# Patient Record
Sex: MALE | Race: WHITE | NOT HISPANIC OR LATINO | Employment: OTHER | URBAN - METROPOLITAN AREA
[De-identification: names, ages, dates, MRNs, and addresses within clinical notes are randomized per-mention and may not be internally consistent; named-entity substitution may affect disease eponyms.]

---

## 2017-07-14 ENCOUNTER — TELEPHONE (OUTPATIENT)
Dept: NEUROSURGERY | Facility: CLINIC | Age: 72
End: 2017-07-14

## 2017-07-14 NOTE — TELEPHONE ENCOUNTER
Called pt regarding sacrum pain. Pt seen previously in our office however did not schedule his f/u appt after MRI. Appt date, time, and location given. Pt verbalized understanding.   ----- Message from Melvina Bender sent at 7/13/2017 11:51 AM CDT -----  Contact: self  Patient called stating he's having sacrum issues. Patient call back is [phone].

## 2017-08-01 ENCOUNTER — OFFICE VISIT (OUTPATIENT)
Dept: NEUROSURGERY | Facility: CLINIC | Age: 72
End: 2017-08-01
Payer: MEDICARE

## 2017-08-01 ENCOUNTER — HOSPITAL ENCOUNTER (OUTPATIENT)
Dept: RADIOLOGY | Facility: HOSPITAL | Age: 72
Discharge: HOME | End: 2017-08-01
Attending: NEUROLOGICAL SURGERY
Payer: MEDICARE

## 2017-08-01 VITALS
HEART RATE: 70 BPM | RESPIRATION RATE: 20 BRPM | HEIGHT: 74 IN | SYSTOLIC BLOOD PRESSURE: 112 MMHG | BODY MASS INDEX: 21.69 KG/M2 | DIASTOLIC BLOOD PRESSURE: 72 MMHG | WEIGHT: 169 LBS

## 2017-08-01 DIAGNOSIS — Z98.890 HISTORY OF LUMBAR SURGERY: Primary | ICD-10-CM

## 2017-08-01 DIAGNOSIS — M47.816 LUMBAR SPONDYLOSIS: ICD-10-CM

## 2017-08-01 DIAGNOSIS — Z98.1 HISTORY OF LUMBAR FUSION: ICD-10-CM

## 2017-08-01 DIAGNOSIS — G89.29 CHRONIC BILATERAL LOW BACK PAIN, WITH SCIATICA PRESENCE UNSPECIFIED: ICD-10-CM

## 2017-08-01 DIAGNOSIS — Z98.890 HISTORY OF LUMBAR SURGERY: ICD-10-CM

## 2017-08-01 DIAGNOSIS — M54.5 CHRONIC BILATERAL LOW BACK PAIN, WITH SCIATICA PRESENCE UNSPECIFIED: ICD-10-CM

## 2017-08-01 PROCEDURE — 1159F MED LIST DOCD IN RCRD: CPT | Mod: S$GLB,,, | Performed by: PHYSICIAN ASSISTANT

## 2017-08-01 PROCEDURE — 99214 OFFICE O/P EST MOD 30 MIN: CPT | Mod: S$GLB,,, | Performed by: PHYSICIAN ASSISTANT

## 2017-08-01 PROCEDURE — 72114 X-RAY EXAM L-S SPINE BENDING: CPT | Mod: TC,PO

## 2017-08-01 PROCEDURE — 1125F AMNT PAIN NOTED PAIN PRSNT: CPT | Mod: S$GLB,,, | Performed by: PHYSICIAN ASSISTANT

## 2017-08-01 PROCEDURE — 72114 X-RAY EXAM L-S SPINE BENDING: CPT | Mod: 26,,, | Performed by: RADIOLOGY

## 2017-08-02 NOTE — PROGRESS NOTES
"Neurosurgery History & Physical    Patient ID: Garcia King is a 72 y.o. male.    Chief Complaint   Patient presents with    Lumbar Spine Pain (L-Spine)     Lumbar pain that radiates down BLE. Prior surgery with Dr. Redmond. Patient reports feeling well after surgery. Was going to PT in [place] and injured "something in his back". Denies any numbness or tingling.        Review of Systems   Constitutional: Negative for activity change, chills, fatigue and unexpected weight change.   HENT: Negative for hearing loss, tinnitus, trouble swallowing and voice change.    Eyes: Negative for visual disturbance.   Respiratory: Negative for apnea, chest tightness and shortness of breath.    Cardiovascular: Negative for chest pain and palpitations.   Gastrointestinal: Negative for abdominal pain, constipation, diarrhea, nausea and vomiting.   Genitourinary: Negative for difficulty urinating, dysuria and frequency.   Musculoskeletal: Positive for back pain and myalgias. Negative for gait problem, neck pain and neck stiffness.   Skin: Negative for wound.   Neurological: Negative for dizziness, tremors, seizures, facial asymmetry, speech difficulty, weakness, light-headedness, numbness and headaches.   Psychiatric/Behavioral: Negative for confusion and decreased concentration.       Past Medical History:   Diagnosis Date    Anticoagulant long-term use     eliquis    Arthritis     COPD (chronic obstructive pulmonary disease)     Coronary artery disease     Diabetes mellitus     Hypertension     Lumbar stenosis without neurogenic claudication     Thyroid disease     hypo     Social History     Social History    Marital status: Unknown     Spouse name: N/A    Number of children: N/A    Years of education: N/A     Occupational History    Not on file.     Social History Main Topics    Smoking status: Former Smoker     Packs/day: 3.00     Years: 30.00     Quit date: 1989    Smokeless tobacco: Never Used    Alcohol use " Yes      Comment: occasional    Drug use: No    Sexual activity: Not on file     Other Topics Concern    Not on file     Social History Narrative    No narrative on file     Family History   Problem Relation Age of Onset    Heart disease Mother     Hypertension Mother     COPD Mother     No Known Problems Father      Review of patient's allergies indicates:   Allergen Reactions    Adhesive Blisters and Hives    Statins-hmg-coa reductase inhibitors Other (See Comments)     Joint/muscle aches       Current Outpatient Prescriptions:     ALBUTEROL SULFATE (PROAIR HFA INHL), Inhale into the lungs., Disp: , Rfl:     alprazolam (XANAX) 0.5 MG tablet, Take 0.5 mg by mouth 3 (three) times daily as needed. , Disp: , Rfl:     apixaban 5 mg Tab, Take 5 mg by mouth 2 (two) times daily., Disp: , Rfl:     aspirin 81 MG Chew, Take 81 mg by mouth once daily., Disp: , Rfl:     ezetimibe (ZETIA) 10 mg tablet, Take 10 mg by mouth every evening. , Disp: , Rfl:     fenofibrate 160 MG Tab, Take 160 mg by mouth once daily., Disp: , Rfl:     fluocinonide (LIDEX) 0.05 % external solution, Apply topically 2 (two) times daily as needed., Disp: , Rfl:     fluticasone (FLONASE) 50 mcg/actuation nasal spray, 1 spray by Each Nare route 2 (two) times daily. , Disp: , Rfl:     FLUZONE HIGH-DOSE 2016-17, PF, 180 mcg/0.5 mL Syrg, TO BE ADMINISTERED BY PHARMACIST FOR IMMUNIZATION, Disp: , Rfl: 0    gabapentin (NEURONTIN) 300 MG capsule, Take 300 mg by mouth 3 (three) times daily., Disp: , Rfl:     ipratropium (ATROVENT) 0.06 % nasal spray, 2 sprays by Nasal route 2 (two) times daily. , Disp: , Rfl:     isosorbide mononitrate (IMDUR) 60 MG 24 hr tablet, Take 60 mg by mouth once daily., Disp: , Rfl:     levothyroxine (SYNTHROID) 50 MCG tablet, Take 50 mcg by mouth once daily., Disp: , Rfl:     lisinopril (PRINIVIL,ZESTRIL) 5 MG tablet, Take 10 mg by mouth once daily. , Disp: , Rfl:     metformin (GLUCOPHAGE) 500 MG tablet, Take  "500 mg by mouth daily with breakfast. , Disp: , Rfl:     nitroGLYCERIN (NITROSTAT) 0.4 MG SL tablet, Place 0.4 mg under the tongue every 5 (five) minutes as needed for Chest pain., Disp: , Rfl:     omeprazole (PRILOSEC) 20 MG capsule, Take 20 mg by mouth once daily., Disp: , Rfl:     oxycodone-acetaminophen (PERCOCET)  mg per tablet, Take 1 tablet by mouth every 4 (four) hours as needed., Disp: , Rfl: 0    polyethylene glycol (GLYCOLAX) 17 gram/dose powder, Take 17 g by mouth once daily., Disp: , Rfl:     senna-docusate 8.6-50 mg (PERICOLACE) 8.6-50 mg per tablet, Take 2 tablets by mouth nightly as needed for Constipation., Disp: , Rfl:     sotalol (BETAPACE) 80 MG tablet, Take 80 mg by mouth 2 (two) times daily., Disp: , Rfl:     tiotropium (SPIRIVA) 18 mcg inhalation capsule, Inhale 18 mcg into the lungs once daily., Disp: , Rfl:     trazodone (DESYREL) 100 MG tablet, , Disp: , Rfl:     trazodone (DESYREL) 50 MG tablet, Take 100 mg by mouth every evening., Disp: , Rfl:     Vitals:    08/01/17 1105   BP: 112/72   BP Location: Left arm   Patient Position: Sitting   BP Method: Automatic   Pulse: 70   Resp: 20   Weight: 76.7 kg (169 lb)   Height: 6' 2" (1.88 m)       Physical Exam   Constitutional: He is oriented to person, place, and time. He appears well-developed and well-nourished.   HENT:   Head: Normocephalic and atraumatic.   Eyes: Pupils are equal, round, and reactive to light.   Neck: Normal range of motion. Neck supple.   Cardiovascular: Normal rate.    Pulmonary/Chest: Effort normal.   Abdominal: He exhibits no distension.   Musculoskeletal: Normal range of motion. He exhibits no edema.   Neurological: He is alert and oriented to person, place, and time. He has a normal Finger-Nose-Finger Test, a normal Heel to Shin Test, a normal Romberg Test and a normal Tandem Gait Test. Gait normal.   Reflex Scores:       Tricep reflexes are 1+ on the right side and 1+ on the left side.       Bicep " reflexes are 1+ on the right side and 1+ on the left side.       Brachioradialis reflexes are 1+ on the right side and 1+ on the left side.       Patellar reflexes are 1+ on the right side and 1+ on the left side.       Achilles reflexes are 1+ on the right side and 1+ on the left side.  Skin: Skin is warm and dry.   Psychiatric: He has a normal mood and affect. His speech is normal and behavior is normal. Judgment and thought content normal.   Nursing note and vitals reviewed.      Neurologic Exam     Mental Status   Oriented to person, place, and time.   Oriented to person.   Oriented to place.   Oriented to time.   Follows 3 step commands.   Attention: normal. Concentration: normal.   Speech: speech is normal   Level of consciousness: alert  Knowledge: consistent with education.   Able to name object. Able to read. Able to repeat. Able to write. Normal comprehension.     Cranial Nerves     CN II   Visual acuity: normal  Right visual field deficit: none  Left visual field deficit: none     CN III, IV, VI   Pupils are equal, round, and reactive to light.  Right pupil: Size: 3 mm. Shape: regular. Reactivity: brisk. Consensual response: intact.   Left pupil: Size: 3 mm. Shape: regular. Reactivity: brisk. Consensual response: intact.   CN III: no CN III palsy  CN VI: no CN VI palsy  Nystagmus: none   Diplopia: none  Ophthalmoparesis: none  Conjugate gaze: present    CN V   Right facial sensation deficit: none  Left facial sensation deficit: none    CN VII   Right facial weakness: none  Left facial weakness: none    CN VIII   Hearing: intact    CN IX, X   CN IX normal.   CN X normal.     CN XI   Right sternocleidomastoid strength: normal  Left sternocleidomastoid strength: normal  Right trapezius strength: normal  Left trapezius strength: normal    CN XII   Fasciculations: absent  Tongue deviation: none    Motor Exam   Muscle bulk: normal  Overall muscle tone: normal  Right arm pronator drift: absent  Left arm pronator  drift: absent    Strength   Right neck flexion: 5/5  Left neck flexion: 5/5  Right neck extension: 5/5  Left neck extension: 5/5  Right deltoid: 5/5  Left deltoid: 5/5  Right biceps: 5/5  Left biceps: 5/5  Right triceps: 5/5  Left triceps: 5/5  Right wrist flexion: 5/5  Left wrist flexion: 5/5  Right wrist extension: 5/5  Left wrist extension: 5/5  Right interossei: 5/5  Left interossei: 5/5  Right abdominals: 5/5  Left abdominals: 5/5  Right iliopsoas: 5/5  Left iliopsoas: 5/  Right quadriceps: 5/5  Left quadriceps: 5/5  Right hamstrin/  Left hamstrin/5  Right glutei: 5/  Left glutei: 5  Right anterior tibial: 5  Left anterior tibial: 5  Right posterior tibial: 5  Left posterior tibial: 5  Right peroneal: 5  Left peroneal:   Right gastroc: 5/  Left gastroc: 5/5    Sensory Exam   Right arm light touch: normal  Left arm light touch: normal  Right leg light touch: normal  Left leg light touch: normal  Right arm vibration: normal  Left arm vibration: normal  Right arm pinprick: normal  Left arm pinprick: normal    Gait, Coordination, and Reflexes     Gait  Gait: normal    Coordination   Romberg: negative  Finger to nose coordination: normal  Heel to shin coordination: normal  Tandem walking coordination: normal    Tremor   Resting tremor: absent  Intention tremor: absent  Action tremor: absent    Reflexes   Right brachioradialis: 1+  Left brachioradialis: 1+  Right biceps: 1+  Left biceps: 1+  Right triceps: 1+  Left triceps: 1+  Right patellar: 1+  Left patellar: 1+  Right achilles: 1+  Left achilles: 1+  Right Turner: absent  Left Turner: absent  Right ankle clonus: absent  Left ankle clonus: absent      Provider dictation:  72 year old  male with history of lumbar surgery presents for 1 year follow up after surgery with continued pain.  He is status post 2016 L2-L5 PSIF with Dr. Redmond. He initially did have some relief of pain after surgery.  However, he continues to have  pain.  Pain is felt across the lower back to to the outer abdominal wall bilaterally.  At night he has bilateral calve and foot muscle spasms.  He denies thigh pain.  He denies numbness and tingling.  He did undergo PT and has had 2 DEEJAY since surgery without benefit.  He takes norco sparingly.    Oswestry score: 50%.  PHQ:  3.    On exam, he has 1+ muscle stretch reflexes throughout the upper and lower extremities.  He has full 5/5 strength and no sensory deficits.    I have reviewed an MRI of the lumbar spine from 6-20-17 demonstrating L2-L5 post operative changes.  He has L5/S1 spondylosis, facet arthropathy and degenerative disk dessication which was present prior to surgery.  There is some bilateral foraminal narrowing at this level as well.    Mr. King has had L2-L5 lumbar PSIF 1 year ago with known L5/S1 lumbar spondylosis and facet arthropathy.  He has not had any benefit with PT or DEEJAY thus far.  We will further evaluation hardware placement with an xray.  We will obtain an EMG/ NCV of the bilateral lower extremities to determine if calf/ foot spasms are manifestations of S1 radiculopathy due to the L5/S1 degenreative chagnes.  If so, he could consider extension of the fusion, if not, then no further surgery would be recommended.  Follow up after imaging and nerve testing.    Visit Diagnosis:  History of lumbar surgery  -     X-Ray Lumbar Complete With Flex And Ext; Future; Expected date: 08/01/2017  -     EMG - 2 Extremities; Future    Chronic bilateral low back pain, with sciatica presence unspecified  -     X-Ray Lumbar Complete With Flex And Ext; Future; Expected date: 08/01/2017  -     EMG - 2 Extremities; Future    Lumbar spondylosis  -     X-Ray Lumbar Complete With Flex And Ext; Future; Expected date: 08/01/2017  -     EMG - 2 Extremities; Future    History of lumbar fusion        Total time spent counseling greater than fifty percent of total visit time.  Counseling included discussion regarding  imaging findings, diagnosis possibilities, treatment options, risks and benefits.   The patient had many questions regarding the options and long-term effects.

## 2017-08-04 ENCOUNTER — OFFICE VISIT (OUTPATIENT)
Dept: PHYSICAL MEDICINE AND REHAB | Facility: CLINIC | Age: 72
End: 2017-08-04
Payer: MEDICARE

## 2017-08-04 DIAGNOSIS — M54.5 CHRONIC BILATERAL LOW BACK PAIN, WITH SCIATICA PRESENCE UNSPECIFIED: ICD-10-CM

## 2017-08-04 DIAGNOSIS — M47.816 LUMBAR SPONDYLOSIS: ICD-10-CM

## 2017-08-04 DIAGNOSIS — Z98.890 HISTORY OF LUMBAR SURGERY: ICD-10-CM

## 2017-08-04 DIAGNOSIS — G89.29 CHRONIC BILATERAL LOW BACK PAIN, WITH SCIATICA PRESENCE UNSPECIFIED: ICD-10-CM

## 2017-08-04 PROCEDURE — 95886 MUSC TEST DONE W/N TEST COMP: CPT | Mod: S$GLB,,, | Performed by: PHYSICAL MEDICINE & REHABILITATION

## 2017-08-04 PROCEDURE — 99499 UNLISTED E&M SERVICE: CPT | Mod: S$GLB,,, | Performed by: PHYSICAL MEDICINE & REHABILITATION

## 2017-08-04 PROCEDURE — 95910 NRV CNDJ TEST 7-8 STUDIES: CPT | Mod: S$GLB,,, | Performed by: PHYSICAL MEDICINE & REHABILITATION

## 2017-08-04 NOTE — PROGRESS NOTES
[facility]  Keyla Edgar D.O  Physical Medicine and Rehab  Phone: [phone]   Fax: [phone]        Full Name: Dario King Gender: Male  Patient ID: [id number] YOB: 1945      Visit Date: 8/4/2017 15:14  Age: 72 Years 2 Months Old  Reason for referral: Leg pain      Sensory NCS      Nerve / Sites Rec. Site Onset Lat Peak Lat Ref. NP Amp Ref. PP Amp Ref. Segments Distance Velocity     ms ms ms µV µV µV µV  cm m/s   R Sural - Ankle (Calf)      Calf Ankle NR NR ?4.20 NR ?5.0 NR ?5.0 Calf - Ankle 14 NR   L Sural - Ankle (Calf)      Calf Ankle NR NR ?4.20 NR ?5.0 NR ?5.0 Calf - Ankle 14 NR   R Superficial peroneal - Ankle      Lat leg Ankle NR NR ?4.40 NR ?5.0 NR ?5.0 Lat leg - Ankle 14 NR   L Superficial peroneal - Ankle      Lat leg Ankle NR NR ?4.40 NR ?5.0 NR ?5.0 Lat leg - Ankle 14 NR       Motor NCS      Nerve / Sites Muscle Latency Ref. Amplitude Ref. Amp % Duration Segments Distance Lat Diff Velocity Ref.     ms ms mV mV % ms  cm ms m/s m/s   R Peroneal - EDB      Ankle EDB 5.10 ?6.20 1.5 ?2.0 100 5.78 Ankle - EDB 8         Fib head EDB 15.36  1.6  105  Fib head - Ankle 33 10.26 32 ?39      Pop fossa EDB 17.34      Pop fossa - Fib head 10 1.98 51 ?39   L Peroneal - EDB      Ankle EDB 10.21 ?6.20 0.2 ?2.0 100 5.42 Ankle - EDB 8         Fib head EDB 6.04  0.2  80.8 8.33 Fib head - Ankle 36 -4.17 86 ?39      Pop fossa EDB       Pop fossa - Fib head 10   ?39   R Tibial - AH      Ankle AH 5.99 ?6.00 3.6 ?3.0 100 5.16 Ankle - AH 8         Pop fossa AH 17.45      Pop fossa - Ankle 41 11.46 36 ?39   L Tibial - AH      Ankle AH 7.24 ?6.00 2.2 ?3.0 100 5.05 Ankle - AH 8         Pop fossa AH 18.39      Pop fossa - Ankle 37 11.15 33 ?39       F  Wave      Nerve Fmin Ref.    ms ms   R Tibial - AH 70.21 ?58.00   L Tibial - AH 67.03 ?58.00       EMG      EMG Summary Table     Spontaneous MUAP Recruitment   Muscle IA Fib PSW Fasc H.F. Amp Dur. PPP Pattern   L. Vastus lateralis N None None None  None 1+ 1+ 1+ Reduced   R. Vastus lateralis N None None None None 1+ 1+ 1+ Reduced   L. Biceps femoris (short head) N None None None None 1+ 1+ 1+ Reduced   R. Biceps femoris (short head) N None None None None 1+ 1+ 1+ Reduced   L. Rectus femoris N None None None None 1+ 1+ 1+ Reduced   R. Rectus femoris N None None None None 1+ 1+ 1+ Reduced   L. Gastrocnemius (Medial head) N None None None None 1+ 1+ 1+ Reduced   R. Gastrocnemius (Medial head) N None None None None 1+ 1+ 1+ Reduced   L. Tibialis anterior N None None None None 2+ 2+ 2+ Reduced   R. Tibialis anterior N None None None None 2+ 2+ 2+ Reduced   L. Gluteus medius N None None None None 1+ 1+ 1+ Reduced   R. Gluteus medius N None None None None N 1+ 1+ Reduced       Summary    The motor conduction test had results outside of the specified normal range in all 4 of the tested nerves:   In the R Peroneal - EDB study  o the peak amplitude result was reduced for Ankle stimulation  o the take off velocity result was reduced for Fib head - Ankle segment   In the L Peroneal - EDB study  o the take off latency result was increased for Ankle stimulation  o the peak amplitude result was reduced for Ankle stimulation   In the R Tibial - AH study  o the take off velocity result was reduced for Pop fossa - Ankle segment   In the L Tibial - AH study  o the take off latency result was increased for Ankle stimulation  o the peak amplitude result was reduced for Ankle stimulation  o the take off velocity result was reduced for Pop fossa - Ankle segment    The sensory conduction test had results outside of the specified normal range in all 4 of the tested nerves:   In the R Sural - Ankle (Calf) study  o the response was considered absent for Calf stimulation   In the L Sural - Ankle (Calf) study  o the response was considered absent for Calf stimulation   In the R Superficial peroneal - Ankle study  o the response was considered absent for Lat leg stimulation   In  the L Superficial peroneal - Ankle study  o the response was considered absent for Lat leg stimulation    The F wave study had results outside of the specified normal range in all 2 of the tested nerves:   In the R Tibial - AH study  o cursor 1 latency result was increased   In the L Tibial - AH study  o cursor 1 latency result was increased    The needle EMG study was abnormal in all 12 tested muscles.   The MUP waveform abnormality was found in L. Vastus lateralis, R. Vastus lateralis, L. Biceps femoris (short head), R. Biceps femoris (short head), L. Rectus femoris, R. Rectus femoris, L. Gastrocnemius (Medial head), R. Gastrocnemius (Medial head), L. Tibialis anterior, R. Tibialis anterior, L. Gluteus medius, R. Gluteus medius.   Abnormal interference pattern was found in L. Vastus lateralis, R. Vastus lateralis, L. Biceps femoris (short head), R. Biceps femoris (short head), L. Rectus femoris, R. Rectus femoris, L. Gastrocnemius (Medial head), R. Gastrocnemius (Medial head), L. Tibialis anterior, R. Tibialis anterior, L. Gluteus medius, R. Gluteus medius.          Conclusion: Abnormal study.    Every muscle tested in the lower extremities showed neurogenic chronic moderate to severe changes. The MUAPs were large and the recruitment pattern was reduced indicating a process that is old and chronic. This pattern is commonly seen in spinal canal stenosis.  There was NO ACTIVE denervation noted in any of the muscles tested.    There was also a superimposed moderate sensorimotor predominantly axonal neuropathy.            ____________________________  Keyla Edgar D.O.

## 2017-08-04 NOTE — LETTER
August 4, 2017      Heath Redmond MD  [address] [facility] [address]  [address]nd Floor  [facility] [address] [place], [place] [address]Physical Med/Rehab  [address] [place] [id number]  Phone: [phone]  Fax: [phone]          Patient: Garcia King   MR Number: [id number]   YOB: 1945   Date of Visit: 8/4/2017       Dear Dr. Heath Redmond:    Thank you for referring Garcia King to me for evaluation. Attached you will find relevant portions of my assessment and plan of care.    If you have questions, please do not hesitate to call me. I look forward to following Garcia King along with you.    Sincerely,    Keyla Edgar,     Enclosure  CC:  No Recipients    If you would like to receive this communication electronically, please contact [email or web address] or [phone] to request more information on [email or web address] Link access.    For providers and/or their staff who would like to refer a patient to [facility], please contact us through our one-stop-shop provider referral line, [facility], at [phone].    If you feel you have received this communication in error or would no longer like to receive these types of communications, please e-mail [email or web address]

## 2017-08-10 ENCOUNTER — TELEPHONE (OUTPATIENT)
Dept: NEUROSURGERY | Facility: CLINIC | Age: 72
End: 2017-08-10

## 2017-08-10 NOTE — TELEPHONE ENCOUNTER
----- Message from Idania Levin PA-C sent at 8/9/2017  3:36 PM CDT -----  Please let him know that I have reviewed his xrays.  He has no hardware complications from his prior surgery.  We will see him back in clinic after nerve testing is complete.

## 2017-08-22 ENCOUNTER — OFFICE VISIT (OUTPATIENT)
Dept: NEUROSURGERY | Facility: CLINIC | Age: 72
End: 2017-08-22
Payer: MEDICARE

## 2017-08-22 VITALS
BODY MASS INDEX: 22.52 KG/M2 | DIASTOLIC BLOOD PRESSURE: 66 MMHG | WEIGHT: 175.5 LBS | SYSTOLIC BLOOD PRESSURE: 106 MMHG | HEART RATE: 65 BPM | HEIGHT: 74 IN

## 2017-08-22 DIAGNOSIS — M47.816 LUMBAR SPONDYLOSIS: ICD-10-CM

## 2017-08-22 DIAGNOSIS — G89.29 CHRONIC BILATERAL LOW BACK PAIN, WITH SCIATICA PRESENCE UNSPECIFIED: ICD-10-CM

## 2017-08-22 DIAGNOSIS — Z98.890 HISTORY OF LUMBAR SURGERY: Primary | ICD-10-CM

## 2017-08-22 DIAGNOSIS — M54.5 CHRONIC BILATERAL LOW BACK PAIN, WITH SCIATICA PRESENCE UNSPECIFIED: ICD-10-CM

## 2017-08-22 PROCEDURE — 3074F SYST BP LT 130 MM HG: CPT | Mod: S$GLB,,, | Performed by: PHYSICIAN ASSISTANT

## 2017-08-22 PROCEDURE — 1125F AMNT PAIN NOTED PAIN PRSNT: CPT | Mod: S$GLB,,, | Performed by: PHYSICIAN ASSISTANT

## 2017-08-22 PROCEDURE — 3008F BODY MASS INDEX DOCD: CPT | Mod: S$GLB,,, | Performed by: PHYSICIAN ASSISTANT

## 2017-08-22 PROCEDURE — 99213 OFFICE O/P EST LOW 20 MIN: CPT | Mod: S$GLB,,, | Performed by: PHYSICIAN ASSISTANT

## 2017-08-22 PROCEDURE — 1159F MED LIST DOCD IN RCRD: CPT | Mod: S$GLB,,, | Performed by: PHYSICIAN ASSISTANT

## 2017-08-22 PROCEDURE — 3078F DIAST BP <80 MM HG: CPT | Mod: S$GLB,,, | Performed by: PHYSICIAN ASSISTANT

## 2017-08-22 RX ORDER — HYDROCODONE BITARTRATE AND ACETAMINOPHEN 7.5; 325 MG/1; MG/1
TABLET ORAL
COMMUNITY
Start: 2017-08-10 | End: 2018-04-12 | Stop reason: SDUPTHER

## 2018-04-12 ENCOUNTER — OFFICE VISIT (OUTPATIENT)
Dept: PAIN MEDICINE | Facility: CLINIC | Age: 73
End: 2018-04-12
Payer: MEDICARE

## 2018-04-12 VITALS
RESPIRATION RATE: 18 BRPM | WEIGHT: 178.13 LBS | HEART RATE: 64 BPM | OXYGEN SATURATION: 100 % | SYSTOLIC BLOOD PRESSURE: 110 MMHG | TEMPERATURE: 97 F | DIASTOLIC BLOOD PRESSURE: 56 MMHG | BODY MASS INDEX: 23.61 KG/M2 | HEIGHT: 73 IN

## 2018-04-12 DIAGNOSIS — M53.3 SACROILIAC JOINT PAIN: Primary | ICD-10-CM

## 2018-04-12 DIAGNOSIS — M54.16 BILATERAL LUMBAR RADICULOPATHY: ICD-10-CM

## 2018-04-12 DIAGNOSIS — M96.1 POSTLAMINECTOMY SYNDROME OF LUMBAR REGION: ICD-10-CM

## 2018-04-12 PROCEDURE — 99999 PR PBB SHADOW E&M-EST. PATIENT-LVL V: CPT | Mod: PBBFAC,,, | Performed by: ANESTHESIOLOGY

## 2018-04-12 PROCEDURE — 99204 OFFICE O/P NEW MOD 45 MIN: CPT | Mod: S$GLB,,, | Performed by: ANESTHESIOLOGY

## 2018-04-12 RX ORDER — PREGABALIN 75 MG/1
75 CAPSULE ORAL 2 TIMES DAILY
Qty: 60 CAPSULE | Refills: 2 | Status: SHIPPED | OUTPATIENT
Start: 2018-04-12 | End: 2018-06-15

## 2018-04-12 RX ORDER — HYDROCODONE BITARTRATE AND ACETAMINOPHEN 7.5; 325 MG/1; MG/1
1 TABLET ORAL EVERY 8 HOURS PRN
Qty: 90 TABLET | Refills: 0 | Status: SHIPPED | OUTPATIENT
Start: 2018-04-12
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
## 2018-04-12 NOTE — LETTER
April 12, 2018      Idania Levin PA-C  [address] [facility] [address]  [address]  [facility] [address]           [facility]  [address] [facility] [address] [place] [address]  Phone: [phone]  Fax: [phone]          Patient: Garcia King   MR Number: [id number]   YOB: 1945   Date of Visit: 4/12/2018       Dear Idania Levin:    Thank you for referring Garcia King to me for evaluation. Attached you will find relevant portions of my assessment and plan of care.    If you have questions, please do not hesitate to call me. I look forward to following Garcia King along with you.    Sincerely,    Ivan King MD    Enclosure  CC:  No Recipients    If you would like to receive this communication electronically, please contact [email or web address] or [phone] to request more information on [facility] access.    For providers and/or their staff who would like to refer a patient to [facility], please contact us through our one-stop-shop provider referral line, [facility], at [phone].    If you feel you have received this communication in error or would no longer like to receive these types of communications, please e-mail [email or web address]

## 2018-05-22 ENCOUNTER — OFFICE VISIT (OUTPATIENT)
Dept: PAIN MEDICINE | Facility: CLINIC | Age: 73
End: 2018-05-22
Payer: MEDICARE

## 2018-05-22 VITALS
OXYGEN SATURATION: 97 % | HEART RATE: 66 BPM | SYSTOLIC BLOOD PRESSURE: 102 MMHG | BODY MASS INDEX: 23.34 KG/M2 | DIASTOLIC BLOOD PRESSURE: 56 MMHG | RESPIRATION RATE: 18 BRPM | WEIGHT: 176.94 LBS | TEMPERATURE: 97 F

## 2018-05-22 DIAGNOSIS — M53.3 PAIN OF BOTH SACROILIAC JOINTS: Primary | ICD-10-CM

## 2018-05-22 DIAGNOSIS — M54.16 CHRONIC LUMBAR RADICULOPATHY: ICD-10-CM

## 2018-05-22 DIAGNOSIS — M54.16 BILATERAL LUMBAR RADICULOPATHY: ICD-10-CM

## 2018-05-22 DIAGNOSIS — M53.3 SACROILIAC JOINT PAIN: ICD-10-CM

## 2018-05-22 DIAGNOSIS — M96.1 POSTLAMINECTOMY SYNDROME OF LUMBAR REGION: ICD-10-CM

## 2018-05-22 DIAGNOSIS — M46.1 SACROILIITIS: ICD-10-CM

## 2018-05-22 PROCEDURE — 99999 PR PBB SHADOW E&M-EST. PATIENT-LVL V: CPT | Mod: PBBFAC,,, | Performed by: ANESTHESIOLOGY

## 2018-05-22 PROCEDURE — 99213 OFFICE O/P EST LOW 20 MIN: CPT | Mod: S$GLB,,, | Performed by: ANESTHESIOLOGY

## 2018-05-22 RX ORDER — SODIUM CHLORIDE, SODIUM LACTATE, POTASSIUM CHLORIDE, CALCIUM CHLORIDE 600; 310; 30; 20 MG/100ML; MG/100ML; MG/100ML; MG/100ML
INJECTION, SOLUTION INTRAVENOUS CONTINUOUS
Status: CANCELLED | OUTPATIENT
Start: 2018-06-18

## 2018-05-22 NOTE — PROGRESS NOTES
This note was completed with dictation software and grammatical errors may exist.    CC:Back pain, bilateral leg pain    HPI: The patient is a 72-year-old man with a history of diabetes, COPD, CAD on anticoagulation who presents in referral from [facility] for continued back pain and bilateral leg pain.  He returns in follow-up today to review records, I have seen that he has undergone numerous procedures in the past even including bilateral SI joint injections.  He reports that he has had several days of relief with procedures, however none long-lasting.  He continues to perseverate on having relief with the lumbar medial branch radiofrequency ablation but I have explained to him that since his fusion, his pain should not be from his facet joints and radiofrequency ablation procedure of the lumbar medial branch nerves is not going to help him.    Pain intervention history: He is status post June 2016 L2-L5 PSIF with Dr. Redmond. He initially did have some relief of pain after surgery. He did undergo PT and has had 2 DEEJAY since surgery without benefit.   He had seen Dr. Chang and had undergone some injections, unclear if any of these were sacroiliac joint injections but he denies any relief..  He was previously taking hydrocodone sparingly, over the years when he has had improvement, he has completely discontinued this.  He has been taking Lyrica 75 mg twice a day with unclear relief.  For the last several months his pain is becoming much more severe and is taking this up to 3 times daily.  He states that he does not like taking this but he has been taking it on and off since 1981.       ROS: He reports back pain, leg pain, difficulty sleeping.  Balance of review of systems is negative.    Past Medical History:   Diagnosis Date    Anticoagulant long-term use     eliquis    Arthritis     COPD (chronic obstructive pulmonary disease)     Coronary artery disease     Diabetes mellitus      Hypertension     Lumbar stenosis without neurogenic claudication     Thyroid disease     hypo       Past Surgical History:   Procedure Laterality Date    CORONARY ARTERY BYPASS GRAFT      single vessel 1997; 4 vessel 2007; plus 8 stents    CORONARY STENT PLACEMENT      x 8    HERNIA REPAIR  1999    umbilical    lumbar decompression      X 3    RHIZOTOMY W/ RADIOFREQUENCY ABLATION         Social History     Social History    Marital status: Unknown     Spouse name: N/A    Number of children: N/A    Years of education: N/A     Social History Main Topics    Smoking status: Former Smoker     Packs/day: 3.00     Years: 30.00     Quit date: 1989    Smokeless tobacco: Never Used    Alcohol use Yes      Comment: occasional    Drug use: No    Sexual activity: Not Asked     Other Topics Concern    None     Social History Narrative    None         Medications/Allergies: See med card    Vitals:    05/22/18 1004   BP: (!) 102/56   Pulse: 66   Resp: 18   Temp: 96.8 °F (36 °C)   TempSrc: Oral   SpO2: 97%   Weight: 80.3 kg (176 lb 14.7 oz)   PainSc:   6   PainLoc: Back         Physical exam:  Gen: A and O x3, pleasant, well-groomed  Skin: No rashes or obvious lesions  HEENT: PERRLA, no obvious deformities on ears or in canals. Trachea midline.  CVS: Regular rate and rhythm, normal palpable pulses.  Resp:No increased work of breathing, symmetrical chest rise.  Abdomen: Soft, NT/ND.  Musculoskeletal:  No antalgic gait.  Has difficulty moving from sitting to standing without using his arms.    Neuro:  Lower extremities: 5/5 strength bilaterally, except for left hip flexion 4/5.  Reflexes: Patellar 1+, Achilles 0+ bilaterally.  Sensory:  Intact and symmetrical to light touch and pinprick in L2-S1 dermatomes bilaterally, hypoesthesia in the lower legs bilaterally.    Lumbar spine:  Lumbar spine: Range of motion is moderately reduced with both flexion and extension with increased pain on both maneuvers.  Roni's test  causes increased low back pain bilaterally, no buttock pain.    Supine straight leg raise is negative bilaterally.    Internal and external rotation of the hip causes no increased pain on either side.  Myofascial exam: No tenderness to palpation across lumbar paraspinous muscles.    Imagin16 Xray L-spine  Findings: Postoperative changes related to prior posterior instrumented fusion L2-L5 again demonstrated.  No definite evidence of hardware failure or loosening.  Minimal retrolisthesis of L2 on L3 is unchanged.  Vertebral body heights are within normal limits.  Mild diffuse disc height loss throughout the fused levels is unchanged with associated osteophyte production.  Disc heights at remaining levels remain preserved. Posterior elements appear grossly intact. No acute fractures or subluxations are demonstrated.  The remaining visualized osseous and soft tissue structures demonstrate no appreciable abnormality.    MRI lumbar spine 17, outside institution: I reviewed the images personally.  There is instrumented fusion with posterior hardware at L2-S1.  Canal is patent at all levels, there is moderate foraminal stenosis to severe foraminal stenosis at multiple levels particularly at L2/3 through L4/5.    EMG 17:  Every muscle tested in the lower extremities showed neurogenic chronic moderate to severe changes. The MUAPs were large and the recruitment pattern was reduced indicating a process that is old and chronic. This pattern is commonly seen in spinal canal stenosis.  There was NO ACTIVE denervation noted in any of the muscles tested.   There was also a superimposed moderate sensorimotor predominantly axonal neuropathy.    Assessment:  The patient is a 72-year-old man with a history of diabetes, COPD, CAD on anticoagulation who presents in referral [facility] for continued back pain and bilateral leg pain.    1. Pain of both sacroiliac joints     2. Chronic lumbar  radiculopathy     3. Postlaminectomy syndrome of lumbar region     4. Bilateral lumbar radiculopathy     5. Sacroiliitis  Vital signs    Activity as tolerated    Place 18-22 gauage peripheral IV     Verify informed consent    Notify physician     Notify physician     Notify physician (specify)    Diet NPO    Case Request Operating Room: BLOCK-JOINT-SACROILIAC    Place in Outpatient    lactated ringers infusion   6. Sacroiliac joint pain       Plan:  1.  I explained that I think his pain is more sacroiliac in nature but may also be neuropathic.  For diagnostic purposes, I think we should set up a bilateral SI joint injection, if he has relief with this I am going to try to get approval for radiofrequency ablation procedure of the SI joints.  Other consideration would be fusion of the SI joints.  2.  Finally, I think he would actually be a fairly good candidate for spinal cord stimulation but he had an ex-wife who had a neurostimulator but had it removed due to infection, he is not willing to consider this.  We'll see him in follow-up in several weeks after the injection or sooner as needed.

## 2018-06-13 DIAGNOSIS — M51.36 DDD (DEGENERATIVE DISC DISEASE), LUMBAR: Primary | ICD-10-CM

## 2018-06-15 RX ORDER — CLOPIDOGREL BISULFATE 75 MG/1
75 TABLET ORAL DAILY
COMMUNITY

## 2018-06-15 RX ORDER — GABAPENTIN 300 MG/1
300 CAPSULE ORAL 3 TIMES DAILY
COMMUNITY

## 2018-06-18 ENCOUNTER — HOSPITAL ENCOUNTER (OUTPATIENT)
Dept: RADIOLOGY | Facility: HOSPITAL | Age: 73
Discharge: HOME | End: 2018-06-18
Attending: ANESTHESIOLOGY | Admitting: ANESTHESIOLOGY
Payer: MEDICARE

## 2018-06-18 ENCOUNTER — SURGERY (OUTPATIENT)
Age: 73
End: 2018-06-18

## 2018-06-18 ENCOUNTER — HOSPITAL ENCOUNTER (OUTPATIENT)
Facility: HOSPITAL | Age: 73
Discharge: HOME | End: 2018-06-18
Attending: ANESTHESIOLOGY | Admitting: ANESTHESIOLOGY
Payer: MEDICARE

## 2018-06-18 VITALS
DIASTOLIC BLOOD PRESSURE: 68 MMHG | RESPIRATION RATE: 16 BRPM | OXYGEN SATURATION: 97 % | HEART RATE: 60 BPM | TEMPERATURE: 98 F | SYSTOLIC BLOOD PRESSURE: 139 MMHG

## 2018-06-18 DIAGNOSIS — M51.36 DDD (DEGENERATIVE DISC DISEASE), LUMBAR: ICD-10-CM

## 2018-06-18 DIAGNOSIS — M46.1 SACROILIITIS: Primary | ICD-10-CM

## 2018-06-18 PROCEDURE — 76000 FLUOROSCOPY <1 HR PHYS/QHP: CPT | Mod: TC,PO

## 2018-06-18 PROCEDURE — 25000003 PHARM REV CODE 250: Mod: PO | Performed by: ANESTHESIOLOGY

## 2018-06-18 PROCEDURE — 27096 INJECT SACROILIAC JOINT: CPT | Mod: 50,PO | Performed by: ANESTHESIOLOGY

## 2018-06-18 PROCEDURE — 25500020 PHARM REV CODE 255: Mod: PO | Performed by: ANESTHESIOLOGY

## 2018-06-18 PROCEDURE — 63600175 PHARM REV CODE 636 W HCPCS: Mod: PO | Performed by: ANESTHESIOLOGY

## 2018-06-18 PROCEDURE — 27096 INJECT SACROILIAC JOINT: CPT | Mod: 50,,, | Performed by: ANESTHESIOLOGY

## 2018-06-18 RX ORDER — SODIUM CHLORIDE, SODIUM LACTATE, POTASSIUM CHLORIDE, CALCIUM CHLORIDE 600; 310; 30; 20 MG/100ML; MG/100ML; MG/100ML; MG/100ML
INJECTION, SOLUTION INTRAVENOUS CONTINUOUS
Status: DISCONTINUED | OUTPATIENT
Start: 2018-06-18 | End: 2018-06-18 | Stop reason: HOSPADM

## 2018-06-18 RX ORDER — LIDOCAINE HYDROCHLORIDE 10 MG/ML
INJECTION, SOLUTION EPIDURAL; INFILTRATION; INTRACAUDAL; PERINEURAL
Status: DISCONTINUED | OUTPATIENT
Start: 2018-06-18 | End: 2018-06-18 | Stop reason: HOSPADM

## 2018-06-18 RX ORDER — MIDAZOLAM HYDROCHLORIDE 2 MG/2ML
INJECTION, SOLUTION INTRAMUSCULAR; INTRAVENOUS
Status: DISCONTINUED | OUTPATIENT
Start: 2018-06-18 | End: 2018-06-18 | Stop reason: HOSPADM

## 2018-06-18 RX ORDER — METHYLPREDNISOLONE ACETATE 80 MG/ML
INJECTION, SUSPENSION INTRA-ARTICULAR; INTRALESIONAL; INTRAMUSCULAR; SOFT TISSUE
Status: DISCONTINUED | OUTPATIENT
Start: 2018-06-18 | End: 2018-06-18 | Stop reason: HOSPADM

## 2018-06-18 RX ORDER — BUPIVACAINE HYDROCHLORIDE 2.5 MG/ML
INJECTION, SOLUTION EPIDURAL; INFILTRATION; INTRACAUDAL
Status: DISCONTINUED | OUTPATIENT
Start: 2018-06-18 | End: 2018-06-18 | Stop reason: HOSPADM

## 2018-06-18 RX ADMIN — MIDAZOLAM HYDROCHLORIDE 1 MG: 1 INJECTION, SOLUTION INTRAMUSCULAR; INTRAVENOUS at 03:06

## 2018-06-18 RX ADMIN — BUPIVACAINE HYDROCHLORIDE 4 ML: 2.5 INJECTION, SOLUTION EPIDURAL; INFILTRATION; INTRACAUDAL; PERINEURAL at 02:06

## 2018-06-18 RX ADMIN — LIDOCAINE HYDROCHLORIDE 10 ML: 10 INJECTION, SOLUTION EPIDURAL; INFILTRATION; INTRACAUDAL; PERINEURAL at 02:06

## 2018-06-18 RX ADMIN — METHYLPREDNISOLONE ACETATE 80 MG: 80 INJECTION, SUSPENSION INTRA-ARTICULAR; INTRALESIONAL; INTRAMUSCULAR; SOFT TISSUE at 02:06

## 2018-06-18 RX ADMIN — IOHEXOL 3 ML: 300 INJECTION, SOLUTION INTRAVENOUS at 02:06

## 2018-06-18 NOTE — OP NOTE
PROCEDURE:  Bilateral sacroiliac joint injection utilizing fluoroscopy.    DIAGNOSIS: Bilateral sided sacroiliitis.  Post op diagnosis: same    PHYSICIAN: Ivan King MD    MEDICATIONS INJECTED:  Methylprednisone 40mg and 2ml Bupivacaine 0.25% to each joint.    LOCAL ANESTHETIC USED: Lidocaine 1%,3ml total.     SEDATION MEDICATIONS: none    ESTIMATED BLOOD LOSS: none    COMPLICATIONS: none    TECHNIQUE:   Time-out taken to identify patient and procedure side prior to starting the procedure. After placing the patient in the prone position, the patient was prepped and draped in the usual sterile fashion using ChloraPrep and sterile towels.  The area was determined under fluoroscopy in the AP view.  Lidocaine was injected by raising a wheal and going down to the periosteum using a 25-gauge 1.5 inch needle.  The 3.5 inch 22-gauge spinal needle was introduced into inferior opening of the right sacroiliac joint.  Negative pressure applied to confirm no intravascular placement.  Omnipaque dye was injected to confirm placement and to confirm that there was no vascular uptake. The medication was then injected slowly. The exact same procedure was then repeated on the left SI joint.  The patient tolerated the procedure well.    The patient was monitored after the procedure.  The patient was given post procedure and discharge instructions to follow at home. The patient was discharged in a stable condition

## 2018-06-18 NOTE — DISCHARGE SUMMARY
[facility]  Discharge Note  Short Stay    Admit Date: 6/18/2018    Discharge Date: 6/18/2018    Attending Physician: Ivan King MD     Discharge Provider: Ivan King    Diagnoses:  Active Hospital Problems    Diagnosis  POA    *Sacroiliitis [M46.1]  Yes      Resolved Hospital Problems    Diagnosis Date Resolved POA   No resolved problems to display.       Discharged Condition: good    Final Diagnoses: Sacroiliitis [M46.1]    Disposition: Home or Self Care    Hospital Course: no complications, uneventful    Outcome of Hospitalization, Treatment, Procedure, or Surgery:  Patient was admitted for outpatient procedure. The patient underwent procedure without complications and are discharged home    Follow up/Patient Instructions:  Follow up as scheduled in Pain Management clinic in 3-4 weeks/Patient has received instructions and follow up date and time    Medications:  Continue previous medications      Discharge Procedure Orders  Call MD for:  temperature >100.4     Call MD for:  severe uncontrolled pain     Call MD for:  redness, tenderness, or signs of infection (pain, swelling, redness, odor or green/yellow discharge around incision site)     Call MD for:  severe persistent headache     No dressing needed           Discharge Procedure Orders (must include Diet, Follow-up, Activity):    Discharge Procedure Orders (must include Diet, Follow-up, Activity)  Call MD for:  temperature >100.4     Call MD for:  severe uncontrolled pain     Call MD for:  redness, tenderness, or signs of infection (pain, swelling, redness, odor or green/yellow discharge around incision site)     Call MD for:  severe persistent headache     No dressing needed

## 2018-06-18 NOTE — INTERVAL H&P NOTE
The patient has been examined and the H&P has been reviewed:    I concur with the findings and no changes have occurred since H&P was written.    Anesthesia/Surgery risks, benefits and alternative options discussed and understood by patient/family.          Active Hospital Problems    Diagnosis  POA    Sacroiliitis [M46.1]  Yes      Resolved Hospital Problems    Diagnosis Date Resolved POA   No resolved problems to display.

## 2018-07-23 ENCOUNTER — OFFICE VISIT (OUTPATIENT)
Dept: PAIN MEDICINE | Facility: CLINIC | Age: 73
End: 2018-07-23
Payer: MEDICARE

## 2018-07-23 VITALS
RESPIRATION RATE: 18 BRPM | WEIGHT: 180 LBS | HEART RATE: 80 BPM | SYSTOLIC BLOOD PRESSURE: 152 MMHG | TEMPERATURE: 96 F | DIASTOLIC BLOOD PRESSURE: 67 MMHG | BODY MASS INDEX: 23.75 KG/M2 | OXYGEN SATURATION: 97 %

## 2018-07-23 DIAGNOSIS — M53.3 PAIN OF LEFT SACROILIAC JOINT: ICD-10-CM

## 2018-07-23 DIAGNOSIS — M53.3 PAIN OF RIGHT SACROILIAC JOINT: Primary | ICD-10-CM

## 2018-07-23 PROCEDURE — 99214 OFFICE O/P EST MOD 30 MIN: CPT | Mod: S$GLB,,, | Performed by: PHYSICIAN ASSISTANT

## 2018-07-23 PROCEDURE — 99999 PR PBB SHADOW E&M-EST. PATIENT-LVL V: CPT | Mod: PBBFAC,,, | Performed by: PHYSICIAN ASSISTANT

## 2018-07-23 RX ORDER — SODIUM CHLORIDE, SODIUM LACTATE, POTASSIUM CHLORIDE, CALCIUM CHLORIDE 600; 310; 30; 20 MG/100ML; MG/100ML; MG/100ML; MG/100ML
INJECTION, SOLUTION INTRAVENOUS CONTINUOUS
Status: CANCELLED | OUTPATIENT
Start: 2018-08-09

## 2018-07-25 NOTE — PROGRESS NOTES
This note was completed with dictation software and grammatical errors may exist.    CC:Back pain    HPI: The patient is a 73-year-old man with a history of diabetes, COPD, CAD on anticoagulation who presents in referral from [facility] for continued back pain and bilateral leg pain. He is status post bilateral sacral iliac joint injections on 6/18/18 with 100% relief lasting 2 weeks, now reporting mild relief.  He complains of pain in the low back and upper gluteal regions that he describes as aching, worse with activity and improved with rest.  He reports pins and needles in his first 2 toes bilaterally.  He states that he stopped taking hydrocodone prior to the injections.  He reports weakness in his legs.  He denies numbness, bladder or bowel incontinence.    Pain intervention history: He is status post June 2016 L2-L5 PSIF with Dr. Redmond. He initially did have some relief of pain after surgery. He did undergo PT and has had 2 DEEJAY since surgery without benefit.   He had seen Dr. Chang and had undergone some injections, unclear if any of these were sacroiliac joint injections but he denies any relief..  He was previously taking hydrocodone sparingly, over the years when he has had improvement, he has completely discontinued this.  He has been taking Lyrica 75 mg twice a day with unclear relief.  For the last several months his pain is becoming much more severe and is taking this up to 3 times daily.  He states that he does not like taking this but he has been taking it on and off since 1981.       ROS: He reports back pain, leg pain, difficulty sleeping.  Balance of review of systems is negative.    Past Medical History:   Diagnosis Date    Anticoagulant long-term use     eliquis    Arthritis     COPD (chronic obstructive pulmonary disease)     Coronary artery disease     Diabetes mellitus     no longer on meds    Hypertension     Lumbar stenosis without neurogenic claudication      Thyroid disease     hypo       Past Surgical History:   Procedure Laterality Date    CORONARY ARTERY BYPASS GRAFT      single vessel 1997; 4 vessel 2007; plus 8 stents    CORONARY STENT PLACEMENT      x 8    HERNIA REPAIR  1999    umbilical    INJECTION OF ANESTHETIC AGENT INTO SACROILIAC JOINT Bilateral 6/18/2018    Procedure: BLOCK-JOINT-SACROILIAC;  Surgeon: Ivan King MD;  Location: [facility] OR;  Service: Pain Management;  Laterality: Bilateral;    lumbar decompression      X 3    RHIZOTOMY W/ RADIOFREQUENCY ABLATION         Social History     Social History    Marital status:      Spouse name: N/A    Number of children: N/A    Years of education: N/A     Social History Main Topics    Smoking status: Former Smoker     Packs/day: 3.00     Years: 30.00     Quit date: 1989    Smokeless tobacco: Never Used    Alcohol use Yes      Comment: occasional    Drug use: No    Sexual activity: Not Asked     Other Topics Concern    None     Social History Narrative    None         Medications/Allergies: See med card    Vitals:    07/23/18 0940   BP: (!) 152/67   Pulse: 80   Resp: 18   Temp: 96.1 °F (35.6 °C)   TempSrc: Oral   SpO2: 97%   Weight: 81.7 kg (180 lb 0.1 oz)   PainSc:   6   PainLoc: Back         Physical exam:  Gen: A and O x3, pleasant, well-groomed  Skin: No rashes or obvious lesions  HEENT: PERRLA, no obvious deformities on ears or in canals. Trachea midline.  CVS: Regular rate and rhythm, normal palpable pulses.  Resp:No increased work of breathing, symmetrical chest rise.  Abdomen: Soft, NT/ND.  Musculoskeletal:  No antalgic gait.  Has difficulty moving from sitting to standing without using his arms.    Neuro:  Lower extremities: 5/5 strength bilaterally, except for left hip flexion 4/5.  Reflexes: Patellar 1+, Achilles 0+ bilaterally.  Sensory:  Intact and symmetrical to light touch and pinprick in L2-S1 dermatomes bilaterally, hypoesthesia in the lower legs  bilaterally.    Lumbar spine:  Lumbar spine: Range of motion is moderately reduced with both flexion and extension with increased pain on both maneuvers.  Roni's test causes increased low back pain bilaterally.    Supine straight leg raise is negative bilaterally.     Internal and external rotation of the hip causes no increased pain on either side.  Myofascial exam: No tenderness to palpation across lumbar paraspinous muscles.  Mild tenderness to palpation to the upper gluteal regions.    Imagin16 Xray L-spine  Findings: Postoperative changes related to prior posterior instrumented fusion L2-L5 again demonstrated.  No definite evidence of hardware failure or loosening.  Minimal retrolisthesis of L2 on L3 is unchanged.  Vertebral body heights are within normal limits.  Mild diffuse disc height loss throughout the fused levels is unchanged with associated osteophyte production.  Disc heights at remaining levels remain preserved. Posterior elements appear grossly intact. No acute fractures or subluxations are demonstrated.  The remaining visualized osseous and soft tissue structures demonstrate no appreciable abnormality.    MRI lumbar spine 17, outside institution: I reviewed the images personally.  There is instrumented fusion with posterior hardware at L2-S1.  Canal is patent at all levels, there is moderate foraminal stenosis to severe foraminal stenosis at multiple levels particularly at L2/3 through L4/5.    EMG 17:  Every muscle tested in the lower extremities showed neurogenic chronic moderate to severe changes. The MUAPs were large and the recruitment pattern was reduced indicating a process that is old and chronic. This pattern is commonly seen in spinal canal stenosis.  There was NO ACTIVE denervation noted in any of the muscles tested.   There was also a superimposed moderate sensorimotor predominantly axonal neuropathy.    Assessment:  The patient is a 73-year-old man with a history  of diabetes, COPD, CAD on anticoagulation who presents in referral from [facility] for continued back pain and bilateral leg pain.    1. Pain of right sacroiliac joint  Vital signs    Place 18-22 gauage peripheral IV     Verify informed consent    Notify physician     Notify physician     Notify physician (specify)    Diet NPO    Case Request Operating Room: RADIOFREQUENCY ABLATION, NERVE, MEDIAL BRANCH, LUMBAR, L5, S1, S2, S3 lateral branch    Place in Outpatient    lactated ringers infusion   2. Pain of left sacroiliac joint       Plan:  1.  The patient had excellent relief following the sacroiliac joint injections but not long-lasting.  I will schedule him for right L5 medial branch and S1, 2 and 3 lateral branch nerve radiofrequency ablation.  If successful, we will schedule him for the left side as well.  2.  Follow-up in 4 weeks post procedure sooner as needed.

## 2018-08-08 DIAGNOSIS — M51.36 DDD (DEGENERATIVE DISC DISEASE), LUMBAR: Primary | ICD-10-CM

## 2018-08-09 ENCOUNTER — HOSPITAL ENCOUNTER (OUTPATIENT)
Facility: HOSPITAL | Age: 73
Discharge: HOME | End: 2018-08-09
Attending: ANESTHESIOLOGY | Admitting: ANESTHESIOLOGY
Payer: MEDICARE

## 2018-08-09 ENCOUNTER — HOSPITAL ENCOUNTER (OUTPATIENT)
Dept: RADIOLOGY | Facility: HOSPITAL | Age: 73
Discharge: HOME | End: 2018-08-09
Attending: ANESTHESIOLOGY | Admitting: ANESTHESIOLOGY
Payer: MEDICARE

## 2018-08-09 ENCOUNTER — SURGERY (OUTPATIENT)
Age: 73
End: 2018-08-09

## 2018-08-09 VITALS
TEMPERATURE: 97 F | RESPIRATION RATE: 14 BRPM | DIASTOLIC BLOOD PRESSURE: 70 MMHG | OXYGEN SATURATION: 98 % | HEART RATE: 66 BPM | SYSTOLIC BLOOD PRESSURE: 149 MMHG

## 2018-08-09 DIAGNOSIS — M51.36 DDD (DEGENERATIVE DISC DISEASE), LUMBAR: ICD-10-CM

## 2018-08-09 DIAGNOSIS — M53.3 PAIN OF RIGHT SACROILIAC JOINT: Primary | ICD-10-CM

## 2018-08-09 PROCEDURE — 25000003 PHARM REV CODE 250: Mod: PO | Performed by: ANESTHESIOLOGY

## 2018-08-09 PROCEDURE — 99152 MOD SED SAME PHYS/QHP 5/>YRS: CPT | Mod: ,,, | Performed by: ANESTHESIOLOGY

## 2018-08-09 PROCEDURE — 64635 DESTROY LUMB/SAC FACET JNT: CPT | Mod: RT,,, | Performed by: ANESTHESIOLOGY

## 2018-08-09 PROCEDURE — 76000 FLUOROSCOPY <1 HR PHYS/QHP: CPT | Mod: TC,PO

## 2018-08-09 PROCEDURE — 64635 DESTROY LUMB/SAC FACET JNT: CPT | Mod: PO | Performed by: ANESTHESIOLOGY

## 2018-08-09 PROCEDURE — A4216 STERILE WATER/SALINE, 10 ML: HCPCS | Mod: PO | Performed by: ANESTHESIOLOGY

## 2018-08-09 PROCEDURE — 64636 DESTROY L/S FACET JNT ADDL: CPT | Mod: PO | Performed by: ANESTHESIOLOGY

## 2018-08-09 PROCEDURE — 63600175 PHARM REV CODE 636 W HCPCS: Mod: PO | Performed by: ANESTHESIOLOGY

## 2018-08-09 PROCEDURE — 64640 INJECTION TREATMENT OF NERVE: CPT | Mod: 59,RT,, | Performed by: ANESTHESIOLOGY

## 2018-08-09 RX ORDER — METHYLPREDNISOLONE ACETATE 80 MG/ML
INJECTION, SUSPENSION INTRA-ARTICULAR; INTRALESIONAL; INTRAMUSCULAR; SOFT TISSUE
Status: DISCONTINUED | OUTPATIENT
Start: 2018-08-09 | End: 2018-08-09 | Stop reason: HOSPADM

## 2018-08-09 RX ORDER — FENTANYL CITRATE 50 UG/ML
INJECTION, SOLUTION INTRAMUSCULAR; INTRAVENOUS
Status: DISCONTINUED | OUTPATIENT
Start: 2018-08-09 | End: 2018-08-09 | Stop reason: HOSPADM

## 2018-08-09 RX ORDER — MIDAZOLAM HYDROCHLORIDE 2 MG/2ML
INJECTION, SOLUTION INTRAMUSCULAR; INTRAVENOUS
Status: DISCONTINUED | OUTPATIENT
Start: 2018-08-09 | End: 2018-08-09 | Stop reason: HOSPADM

## 2018-08-09 RX ORDER — SODIUM CHLORIDE, SODIUM LACTATE, POTASSIUM CHLORIDE, CALCIUM CHLORIDE 600; 310; 30; 20 MG/100ML; MG/100ML; MG/100ML; MG/100ML
INJECTION, SOLUTION INTRAVENOUS CONTINUOUS
Status: DISCONTINUED | OUTPATIENT
Start: 2018-08-09 | End: 2018-08-09 | Stop reason: HOSPADM

## 2018-08-09 RX ORDER — LIDOCAINE HYDROCHLORIDE 20 MG/ML
INJECTION, SOLUTION EPIDURAL; INFILTRATION; INTRACAUDAL; PERINEURAL
Status: DISCONTINUED | OUTPATIENT
Start: 2018-08-09 | End: 2018-08-09 | Stop reason: HOSPADM

## 2018-08-09 RX ORDER — SODIUM CHLORIDE 9 MG/ML
INJECTION, SOLUTION INTRAMUSCULAR; INTRAVENOUS; SUBCUTANEOUS
Status: DISCONTINUED | OUTPATIENT
Start: 2018-08-09 | End: 2018-08-09 | Stop reason: HOSPADM

## 2018-08-09 RX ADMIN — LIDOCAINE HYDROCHLORIDE 10 ML: 20 INJECTION, SOLUTION EPIDURAL; INFILTRATION; INTRACAUDAL; PERINEURAL at 01:08

## 2018-08-09 RX ADMIN — MIDAZOLAM HYDROCHLORIDE 1 MG: 1 INJECTION, SOLUTION INTRAMUSCULAR; INTRAVENOUS at 12:08

## 2018-08-09 RX ADMIN — FENTANYL CITRATE 25 MCG: 50 INJECTION, SOLUTION INTRAMUSCULAR; INTRAVENOUS at 01:08

## 2018-08-09 RX ADMIN — SODIUM CHLORIDE, SODIUM LACTATE, POTASSIUM CHLORIDE, AND CALCIUM CHLORIDE 500 ML: .6; .31; .03; .02 INJECTION, SOLUTION INTRAVENOUS at 12:08

## 2018-08-09 RX ADMIN — SODIUM CHLORIDE 3 ML: 9 INJECTION INTRAMUSCULAR; INTRAVENOUS; SUBCUTANEOUS at 01:08

## 2018-08-09 RX ADMIN — LIDOCAINE HYDROCHLORIDE 9 ML: 20 INJECTION, SOLUTION EPIDURAL; INFILTRATION; INTRACAUDAL; PERINEURAL at 01:08

## 2018-08-09 RX ADMIN — FENTANYL CITRATE 25 MCG: 50 INJECTION, SOLUTION INTRAMUSCULAR; INTRAVENOUS at 12:08

## 2018-08-09 RX ADMIN — METHYLPREDNISOLONE ACETATE 80 MG: 80 INJECTION, SUSPENSION INTRA-ARTICULAR; INTRALESIONAL; INTRAMUSCULAR; SOFT TISSUE at 01:08

## 2018-08-09 NOTE — DISCHARGE SUMMARY
[facility]  Discharge Note  Short Stay    Admit Date: 8/9/2018    Discharge Date: 8/9/2018    Attending Physician: Ivan King MD     Discharge Provider: Ivan King    Diagnoses:  Active Hospital Problems    Diagnosis  POA    *Pain of right sacroiliac joint [M53.3]  Yes      Resolved Hospital Problems    Diagnosis Date Resolved POA   No resolved problems to display.       Discharged Condition: good    Final Diagnoses: Pain of right sacroiliac joint [M53.3]    Disposition: Home or Self Care    Hospital Course: no complications, uneventful    Outcome of Hospitalization, Treatment, Procedure, or Surgery:  Patient was admitted for outpatient procedure. The patient underwent procedure without complications and are discharged home    Follow up/Patient Instructions:  Follow up as scheduled in Pain Management clinic in 3-4 weeks/Patient has received instructions and follow up date and time    Medications:  Continue previous medications      Discharge Procedure Orders  Call MD for:  temperature >100.4     Call MD for:  severe uncontrolled pain     Call MD for:  redness, tenderness, or signs of infection (pain, swelling, redness, odor or green/yellow discharge around incision site)     Call MD for:  severe persistent headache     No dressing needed           Discharge Procedure Orders (must include Diet, Follow-up, Activity):    Discharge Procedure Orders (must include Diet, Follow-up, Activity)  Call MD for:  temperature >100.4     Call MD for:  severe uncontrolled pain     Call MD for:  redness, tenderness, or signs of infection (pain, swelling, redness, odor or green/yellow discharge around incision site)     Call MD for:  severe persistent headache     No dressing needed

## 2018-08-09 NOTE — OP NOTE
PROCEDURE:  1.  Radiofrequency ablation of the right L5 dorsal ramus/medial branch  2.  Sacroiliac joint ablation, lateral branches of the right S1, S2, S3    DIAGNOSIS: Right sided sacroiliitis.     Post op diagnosis: same    PHYSICIAN: Ivan King MD    MEDICATIONS INJECTED:  1.  2% lidocaine 1 mL to each site to anesthetize prior to ablation  2.  From a mixture of Methylprednisone 80mg and 9ml Bupivacaine 0.25%, 1 mL was injected into each introducer after ablation for prevention of neuritis.    LOCAL ANESTHETIC USED: Lidocaine 1%,3ml total at each location.     SEDATION MEDICATIONS: 4mg versed, 25mcg fentanyl    ESTIMATED BLOOD LOSS: none    COMPLICATIONS: none    The patient was brought into the fluoroscopy suite and carefully assisted into the prone position on the fluoroscopy table and allowed to adjust to a position of comfort.  A grounding pad was placed on the back thigh.  The low back and buttocks were widely prepped with ChloraPrep solution, allowed to air dry and draped in standard sterile surgical fashion. After determining the needle insertion sites using fluoroscopy, local anesthesia was provided by making a small wheal in the skin with a 25 gauge 1.5 in needl e and then injected slightly deeper.    On the right side, a 17 gauge 75mm radiofrequency introducer needle was placed to the plan and a time a target, guided with intermittent fluoroscopy with a perpendicular approach to place at the sacral ala.  Needle tip position was confirmed in AP and oblique views. Then, the same size needles were to the lateral side of the S1, S2 and S3 foramen, approximately 7-10 mm lateral to the foramen. The 1st positioning of these needles was to the 2:00 position of each foramen. The stylette was withdrawn from the introducer at L5, S1, S2 and S3 and radiofrequency probes with 4 mm active tip fully inserted into the introducer.  A lateral view was obtained for standard reference.  At each site, the lateral  branch (or medial branch in the case of L5) was stimulated at 2 Hz to a maximum of 2.5 volts to ensure safe needle and electrode placement.    Each target was anesthetized with about 1 mL of 2% lidocaine for lesioning then each target was lesion at 80 degree C for 2 min and 30 sec.  Tissue impedances were noted to be between 250-500 Ohms.  After lesioning, 1 mL of solution described above was injected into each introducer to prevent neuritis.  The needles were removed and bandages placed over the needle placement sites and the patient returned to the supine position on stretcher and transported to the recovery room without hemodynamic, neurologic or allergic reactions.    The patient was monitored after the procedure.  The patient was given post procedure and discharge instructions to follow at home. The patient was discharged in a stable condition

## 2018-08-09 NOTE — PLAN OF CARE
Patient tolerating oral liquids without difficulty. No apparent s&s of distress noted at this time, no complaints voiced at this time. Discharge instructions reviewed with patient/family/friend with good verbal feedback received. Patient ready for discharge

## 2018-08-09 NOTE — H&P (VIEW-ONLY)
This note was completed with dictation software and grammatical errors may exist.    CC:Back pain    HPI: The patient is a 73-year-old man with a history of diabetes, COPD, CAD on anticoagulation who presents in referral [facility] for continued back pain and bilateral leg pain. He is status post bilateral sacral iliac joint injections on 6/18/18 with 100% relief lasting 2 weeks, now reporting mild relief.  He complains of pain in the low back and upper gluteal regions that he describes as aching, worse with activity and improved with rest.  He reports pins and needles in his first 2 toes bilaterally.  He states that he stopped taking hydrocodone prior to the injections.  He reports weakness in his legs.  He denies numbness, bladder or bowel incontinence.    Pain intervention history: He is status post June 2016 L2-L5 PSIF with Dr. Redmond. He initially did have some relief of pain after surgery. He did undergo PT and has had 2 DEEJAY since surgery without benefit.   He had seen Dr. Chang and had undergone some injections, unclear if any of these were sacroiliac joint injections but he denies any relief..  He was previously taking hydrocodone sparingly, over the years when he has had improvement, he has completely discontinued this.  He has been taking Lyrica 75 mg twice a day with unclear relief.  For the last several months his pain is becoming much more severe and is taking this up to 3 times daily.  He states that he does not like taking this but he has been taking it on and off since 1981.       ROS: He reports back pain, leg pain, difficulty sleeping.  Balance of review of systems is negative.    Past Medical History:   Diagnosis Date    Anticoagulant long-term use     eliquis    Arthritis     COPD (chronic obstructive pulmonary disease)     Coronary artery disease     Diabetes mellitus     no longer on meds    Hypertension     Lumbar stenosis without neurogenic claudication      Thyroid disease     hypo       Past Surgical History:   Procedure Laterality Date    CORONARY ARTERY BYPASS GRAFT      single vessel 1997; 4 vessel 2007; plus 8 stents    CORONARY STENT PLACEMENT      x 8    HERNIA REPAIR  1999    umbilical    INJECTION OF ANESTHETIC AGENT INTO SACROILIAC JOINT Bilateral 6/18/2018    Procedure: BLOCK-JOINT-SACROILIAC;  Surgeon: Ivan King MD;  Location: [facility] OR;  Service: Pain Management;  Laterality: Bilateral;    lumbar decompression      X 3    RHIZOTOMY W/ RADIOFREQUENCY ABLATION         Social History     Social History    Marital status:      Spouse name: N/A    Number of children: N/A    Years of education: N/A     Social History Main Topics    Smoking status: Former Smoker     Packs/day: 3.00     Years: 30.00     Quit date: 1989    Smokeless tobacco: Never Used    Alcohol use Yes      Comment: occasional    Drug use: No    Sexual activity: Not Asked     Other Topics Concern    None     Social History Narrative    None         Medications/Allergies: See med card    Vitals:    07/23/18 0940   BP: (!) 152/67   Pulse: 80   Resp: 18   Temp: 96.1 °F (35.6 °C)   TempSrc: Oral   SpO2: 97%   Weight: 81.7 kg (180 lb 0.1 oz)   PainSc:   6   PainLoc: Back         Physical exam:  Gen: A and O x3, pleasant, well-groomed  Skin: No rashes or obvious lesions  HEENT: PERRLA, no obvious deformities on ears or in canals. Trachea midline.  CVS: Regular rate and rhythm, normal palpable pulses.  Resp:No increased work of breathing, symmetrical chest rise.  Abdomen: Soft, NT/ND.  Musculoskeletal:  No antalgic gait.  Has difficulty moving from sitting to standing without using his arms.    Neuro:  Lower extremities: 5/5 strength bilaterally, except for left hip flexion 4/5.  Reflexes: Patellar 1+, Achilles 0+ bilaterally.  Sensory:  Intact and symmetrical to light touch and pinprick in L2-S1 dermatomes bilaterally, hypoesthesia in the lower legs  bilaterally.    Lumbar spine:  Lumbar spine: Range of motion is moderately reduced with both flexion and extension with increased pain on both maneuvers.  Roni's test causes increased low back pain bilaterally.    Supine straight leg raise is negative bilaterally.     Internal and external rotation of the hip causes no increased pain on either side.  Myofascial exam: No tenderness to palpation across lumbar paraspinous muscles.  Mild tenderness to palpation to the upper gluteal regions.    Imagin16 Xray L-spine  Findings: Postoperative changes related to prior posterior instrumented fusion L2-L5 again demonstrated.  No definite evidence of hardware failure or loosening.  Minimal retrolisthesis of L2 on L3 is unchanged.  Vertebral body heights are within normal limits.  Mild diffuse disc height loss throughout the fused levels is unchanged with associated osteophyte production.  Disc heights at remaining levels remain preserved. Posterior elements appear grossly intact. No acute fractures or subluxations are demonstrated.  The remaining visualized osseous and soft tissue structures demonstrate no appreciable abnormality.    MRI lumbar spine 17, outside institution: I reviewed the images personally.  There is instrumented fusion with posterior hardware at L2-S1.  Canal is patent at all levels, there is moderate foraminal stenosis to severe foraminal stenosis at multiple levels particularly at L2/3 through L4/5.    EMG 17:  Every muscle tested in the lower extremities showed neurogenic chronic moderate to severe changes. The MUAPs were large and the recruitment pattern was reduced indicating a process that is old and chronic. This pattern is commonly seen in spinal canal stenosis.  There was NO ACTIVE denervation noted in any of the muscles tested.   There was also a superimposed moderate sensorimotor predominantly axonal neuropathy.    Assessment:  The patient is a 73-year-old man with a history  of diabetes, COPD, CAD on anticoagulation who presents in referral [facility] for continued back pain and bilateral leg pain.    1. Pain of right sacroiliac joint  Vital signs    Place 18-22 gauage peripheral IV     Verify informed consent    Notify physician     Notify physician     Notify physician (specify)    Diet NPO    Case Request Operating Room: RADIOFREQUENCY ABLATION, NERVE, MEDIAL BRANCH, LUMBAR, L5, S1, S2, S3 lateral branch    Place in Outpatient    lactated ringers infusion   2. Pain of left sacroiliac joint       Plan:  1.  The patient had excellent relief following the sacroiliac joint injections but not long-lasting.  I will schedule him for right L5 medial branch and S1, 2 and 3 lateral branch nerve radiofrequency ablation.  If successful, we will schedule him for the left side as well.  2.  Follow-up in 4 weeks post procedure sooner as needed.

## 2018-08-09 NOTE — DISCHARGE INSTRUCTIONS
Home care instructions  Apply ice pack to the injection site for 20 minutes periods for the first 24 hrs for soreness/discomfort at injection site DO NOT USE HEAT FOR 24 HOURS  Keep site clean and dry for 24 hours, remove bandaid when desired  Do not drive until tomorrow  Return to normal activity. Pain clinic will call to check to see how the block is working.  Make take 2 weeks to feel the full effects   Resume home medication as prescribed today  Resume Aspirin, Plavix, or Coumadin the day after the procedure unless otherwise instructed.    SEE IMMEDIATE MEDICAL HELP FOR:  Severe increase in your usual pain or appearance of new pain  Prolonged or increasing weakness or numbness in the legs or arms  Drainage, redness, active bleeding, or increased swelling at the injection site  Temperature over 100.0 degrees F.  Headache that increases when your head is upright and decreases when you lie flat    CALL 911 OR GO DIRECTLY TO EMERGENCY DEPARTMENT FOR:  Shortness of breath, chest pain, or problems breathing

## 2018-08-27 ENCOUNTER — OFFICE VISIT (OUTPATIENT)
Dept: PAIN MEDICINE | Facility: CLINIC | Age: 73
End: 2018-08-27
Payer: MEDICARE

## 2018-08-27 VITALS
TEMPERATURE: 97 F | WEIGHT: 176.81 LBS | DIASTOLIC BLOOD PRESSURE: 59 MMHG | SYSTOLIC BLOOD PRESSURE: 113 MMHG | BODY MASS INDEX: 23.33 KG/M2 | HEART RATE: 69 BPM | RESPIRATION RATE: 20 BRPM | OXYGEN SATURATION: 99 %

## 2018-08-27 DIAGNOSIS — M53.3 SACROILIAC JOINT PAIN: ICD-10-CM

## 2018-08-27 DIAGNOSIS — M53.3 PAIN OF LEFT SACROILIAC JOINT: Primary | ICD-10-CM

## 2018-08-27 PROCEDURE — 99999 PR PBB SHADOW E&M-EST. PATIENT-LVL V: CPT | Mod: PBBFAC,,, | Performed by: PHYSICIAN ASSISTANT

## 2018-08-27 PROCEDURE — 99213 OFFICE O/P EST LOW 20 MIN: CPT | Mod: S$GLB,,, | Performed by: PHYSICIAN ASSISTANT

## 2018-08-27 RX ORDER — SODIUM CHLORIDE, SODIUM LACTATE, POTASSIUM CHLORIDE, CALCIUM CHLORIDE 600; 310; 30; 20 MG/100ML; MG/100ML; MG/100ML; MG/100ML
INJECTION, SOLUTION INTRAVENOUS CONTINUOUS
Status: CANCELLED | OUTPATIENT
Start: 2018-09-13

## 2018-08-27 NOTE — H&P (VIEW-ONLY)
This note was completed with dictation software and grammatical errors may exist.    CC:Back pain    HPI: The patient is a 73-year-old man with a history of diabetes, COPD, CAD on anticoagulation who presents in referral [facility] for continued back pain and bilateral leg pain. He is status post right L5 medial branch and S1, 2 and 3 lateral branch radiofrequency ablation on 08/09/2018 with almost 100% relief his right low back and right buttock pain. He continues to have the same pain on the left side that he describes as aching.  This is worse with activity and improved with rest.  He complains of some weakness in his legs.  He denies numbness, bladder or bowel incontinence.    Pain intervention history: He is status post June 2016 L2-L5 PSIF with Dr. Redmond. He initially did have some relief of pain after surgery. He did undergo PT and has had 2 DEEJAY since surgery without benefit.   He had seen Dr. Chang and had undergone some injections, unclear if any of these were sacroiliac joint injections but he denies any relief..  He was previously taking hydrocodone sparingly, over the years when he has had improvement, he has completely discontinued this.  He has been taking Lyrica 75 mg twice a day with unclear relief.  For the last several months his pain is becoming much more severe and is taking this up to 3 times daily.  He states that he does not like taking this but he has been taking it on and off since 1981.  He is status post bilateral sacral iliac joint injections on 6/18/18 with 100% relief lasting 2 weeks, now reporting mild relief. He is status post right L5 medial branch and S1, 2 and 3 lateral branch radiofrequency ablation on 08/09/2018 with almost 100% relief his right low back and right buttock pain.    ROS: He reports back pain, leg pain, difficulty sleeping.  Balance of review of systems is negative.    Past Medical History:   Diagnosis Date    Anticoagulant long-term use      eliquis    Arthritis     COPD (chronic obstructive pulmonary disease)     Coronary artery disease     Diabetes mellitus     no longer on meds    Hypertension     Lumbar stenosis without neurogenic claudication     Thyroid disease     hypo       Past Surgical History:   Procedure Laterality Date    CORONARY ARTERY BYPASS GRAFT      single vessel ; 4 vessel ; plus 8 stents    CORONARY STENT PLACEMENT      x 8    HERNIA REPAIR      umbilical    lumbar decompression      X 3    RHIZOTOMY W/ RADIOFREQUENCY ABLATION         Social History     Socioeconomic History    Marital status:      Spouse name: None    Number of children: None    Years of education: None    Highest education level: None   Social Needs    Financial resource strain: None    Food insecurity - worry: None    Food insecurity - inability: None    Transportation needs - medical: None    Transportation needs - non-medical: None   Occupational History    None   Tobacco Use    Smoking status: Former Smoker     Packs/day: 3.00     Years: 30.00     Pack years: 90.00     Last attempt to quit:      Years since quittin.6    Smokeless tobacco: Never Used   Substance and Sexual Activity    Alcohol use: Yes     Comment: occasional    Drug use: No    Sexual activity: None   Other Topics Concern    None   Social History Narrative    None         Medications/Allergies: See med card    Vitals:    18 1051   BP: (!) 113/59   Pulse: 69   Resp: 20   Temp: 97.4 °F (36.3 °C)   TempSrc: Oral   SpO2: 99%   Weight: 80.2 kg (176 lb 12.9 oz)   PainSc:   6   PainLoc: Back         Physical exam:  Gen: A and O x3, pleasant, well-groomed  Skin: No rashes or obvious lesions  HEENT: PERRLA, no obvious deformities on ears or in canals. Trachea midline.  CVS: Regular rate and rhythm, normal palpable pulses.  Resp:No increased work of breathing, symmetrical chest rise.  Abdomen: Soft, NT/ND.  Musculoskeletal:  No antalgic  gait.  Has difficulty moving from sitting to standing without using his arms.    Neuro:  Lower extremities: 5/5 strength bilaterally, except for left hip flexion 4/5.  Reflexes: Patellar 0+, Achilles 0+ bilaterally.  Sensory:  Intact and symmetrical to light touch and pinprick in L2-S1 dermatomes bilaterally, hypoesthesia in the lower legs bilaterally.    Lumbar spine:  Lumbar spine: Range of motion is moderately reduced with both flexion and extension with increased left low back and buttock pain on both maneuvers.  Roni's test causes increased left low back pain and left upper buttock pain.    Supine straight leg raise is negative bilaterally.     Internal and external rotation of the hip causes no increased pain on either side.  Myofascial exam: No tenderness to palpation across lumbar paraspinous muscles.  Mild tenderness to palpation to the left upper gluteal region.    Imagin16 Xray L-spine  Findings: Postoperative changes related to prior posterior instrumented fusion L2-L5 again demonstrated.  No definite evidence of hardware failure or loosening.  Minimal retrolisthesis of L2 on L3 is unchanged.  Vertebral body heights are within normal limits.  Mild diffuse disc height loss throughout the fused levels is unchanged with associated osteophyte production.  Disc heights at remaining levels remain preserved. Posterior elements appear grossly intact. No acute fractures or subluxations are demonstrated.  The remaining visualized osseous and soft tissue structures demonstrate no appreciable abnormality.    MRI lumbar spine 17, outside institution: I reviewed the images personally.  There is instrumented fusion with posterior hardware at L2-S1.  Canal is patent at all levels, there is moderate foraminal stenosis to severe foraminal stenosis at multiple levels particularly at L2/3 through L4/5.    EMG 17:  Every muscle tested in the lower extremities showed neurogenic chronic moderate to severe  changes. The MUAPs were large and the recruitment pattern was reduced indicating a process that is old and chronic. This pattern is commonly seen in spinal canal stenosis.  There was NO ACTIVE denervation noted in any of the muscles tested.   There was also a superimposed moderate sensorimotor predominantly axonal neuropathy.    Assessment:  The patient is a 73-year-old man with a history of diabetes, COPD, CAD on anticoagulation who presents in referral [facility] for continued back pain and bilateral leg pain.    1. Pain of left sacroiliac joint     2. Sacroiliac joint pain       Plan:  1.  The patient had almost 100% relief on the right side following right L5 medial branch and S1, 2 and 3 lateral branch radiofrequency ablation.  I will schedule him for left L5 medial branch and S1, 2 and 3 lateral branch nerve RFA.  2.  Follow-up in 4 weeks postprocedure or sooner as needed.

## 2018-09-12 DIAGNOSIS — M51.36 DDD (DEGENERATIVE DISC DISEASE), LUMBAR: Primary | ICD-10-CM

## 2018-09-13 ENCOUNTER — HOSPITAL ENCOUNTER (OUTPATIENT)
Dept: RADIOLOGY | Facility: HOSPITAL | Age: 73
Discharge: HOME | End: 2018-09-13
Attending: ANESTHESIOLOGY | Admitting: ANESTHESIOLOGY
Payer: MEDICARE

## 2018-09-13 ENCOUNTER — HOSPITAL ENCOUNTER (OUTPATIENT)
Facility: HOSPITAL | Age: 73
Discharge: HOME | End: 2018-09-13
Attending: ANESTHESIOLOGY | Admitting: ANESTHESIOLOGY
Payer: MEDICARE

## 2018-09-13 VITALS
OXYGEN SATURATION: 100 % | RESPIRATION RATE: 16 BRPM | WEIGHT: 180 LBS | SYSTOLIC BLOOD PRESSURE: 150 MMHG | HEIGHT: 74 IN | DIASTOLIC BLOOD PRESSURE: 79 MMHG | HEART RATE: 70 BPM | BODY MASS INDEX: 23.1 KG/M2 | TEMPERATURE: 98 F

## 2018-09-13 DIAGNOSIS — M51.36 DDD (DEGENERATIVE DISC DISEASE), LUMBAR: ICD-10-CM

## 2018-09-13 DIAGNOSIS — M53.3 PAIN OF LEFT SACROILIAC JOINT: Primary | ICD-10-CM

## 2018-09-13 PROCEDURE — 25000003 PHARM REV CODE 250: Mod: PO | Performed by: ANESTHESIOLOGY

## 2018-09-13 PROCEDURE — 64636 DESTROY L/S FACET JNT ADDL: CPT | Mod: LT,,, | Performed by: ANESTHESIOLOGY

## 2018-09-13 PROCEDURE — 76000 FLUOROSCOPY <1 HR PHYS/QHP: CPT | Mod: TC,PO

## 2018-09-13 PROCEDURE — 99152 MOD SED SAME PHYS/QHP 5/>YRS: CPT | Mod: ,,, | Performed by: ANESTHESIOLOGY

## 2018-09-13 PROCEDURE — A4216 STERILE WATER/SALINE, 10 ML: HCPCS | Mod: PO | Performed by: ANESTHESIOLOGY

## 2018-09-13 PROCEDURE — 63600175 PHARM REV CODE 636 W HCPCS: Mod: PO | Performed by: ANESTHESIOLOGY

## 2018-09-13 PROCEDURE — 64636 DESTROY L/S FACET JNT ADDL: CPT | Mod: PO | Performed by: ANESTHESIOLOGY

## 2018-09-13 PROCEDURE — 64635 DESTROY LUMB/SAC FACET JNT: CPT | Mod: LT,,, | Performed by: ANESTHESIOLOGY

## 2018-09-13 PROCEDURE — 64635 DESTROY LUMB/SAC FACET JNT: CPT | Mod: PO | Performed by: ANESTHESIOLOGY

## 2018-09-13 RX ORDER — LIDOCAINE HYDROCHLORIDE 10 MG/ML
INJECTION, SOLUTION EPIDURAL; INFILTRATION; INTRACAUDAL; PERINEURAL
Status: DISCONTINUED | OUTPATIENT
Start: 2018-09-13 | End: 2018-09-13 | Stop reason: HOSPADM

## 2018-09-13 RX ORDER — FENTANYL CITRATE 50 UG/ML
INJECTION, SOLUTION INTRAMUSCULAR; INTRAVENOUS
Status: DISCONTINUED | OUTPATIENT
Start: 2018-09-13 | End: 2018-09-13 | Stop reason: HOSPADM

## 2018-09-13 RX ORDER — MIDAZOLAM HYDROCHLORIDE 2 MG/2ML
INJECTION, SOLUTION INTRAMUSCULAR; INTRAVENOUS
Status: DISCONTINUED | OUTPATIENT
Start: 2018-09-13 | End: 2018-09-13 | Stop reason: HOSPADM

## 2018-09-13 RX ORDER — LIDOCAINE HYDROCHLORIDE 20 MG/ML
INJECTION, SOLUTION EPIDURAL; INFILTRATION; INTRACAUDAL; PERINEURAL
Status: DISCONTINUED | OUTPATIENT
Start: 2018-09-13 | End: 2018-09-13 | Stop reason: HOSPADM

## 2018-09-13 RX ORDER — SODIUM CHLORIDE 9 MG/ML
INJECTION, SOLUTION INTRAMUSCULAR; INTRAVENOUS; SUBCUTANEOUS
Status: DISCONTINUED | OUTPATIENT
Start: 2018-09-13 | End: 2018-09-13 | Stop reason: HOSPADM

## 2018-09-13 RX ORDER — SODIUM CHLORIDE, SODIUM LACTATE, POTASSIUM CHLORIDE, CALCIUM CHLORIDE 600; 310; 30; 20 MG/100ML; MG/100ML; MG/100ML; MG/100ML
INJECTION, SOLUTION INTRAVENOUS CONTINUOUS
Status: DISCONTINUED | OUTPATIENT
Start: 2018-09-13 | End: 2018-09-13 | Stop reason: HOSPADM

## 2018-09-13 RX ORDER — METHYLPREDNISOLONE ACETATE 40 MG/ML
INJECTION, SUSPENSION INTRA-ARTICULAR; INTRALESIONAL; INTRAMUSCULAR; SOFT TISSUE
Status: DISCONTINUED | OUTPATIENT
Start: 2018-09-13 | End: 2018-09-13 | Stop reason: HOSPADM

## 2018-09-13 RX ADMIN — SODIUM CHLORIDE, SODIUM LACTATE, POTASSIUM CHLORIDE, AND CALCIUM CHLORIDE: .6; .31; .03; .02 INJECTION, SOLUTION INTRAVENOUS at 12:09

## 2018-09-13 NOTE — DISCHARGE INSTRUCTIONS
Recovery After Procedural Sedation (Adult)  You have been given medicine by vein to make you sleep during your surgery. This may have included both a pain medicine and sleeping medicine. Most of the effects have worn off. But you may still have some drowsiness for the next 6 to 8 hours.  Home care  Follow these guidelines when you get home:  · For the next 8 hours, you should be watched by a responsible adult. This person should make sure your condition is not getting worse.  · Don't drink any alcohol for the next 24 hours.  · Don't drive, operate dangerous machinery, or make important business or personal decisions during the next 24 hours.  Note: Your healthcare provider may tell you not to take any medicine by mouth for pain or sleep in the next 4 hours. These medicines may react with the medicines you were given in the hospital. This could cause a much stronger response than usual.  Follow-up care  Follow up with your healthcare provider if you are not alert and back to your usual level of activity within 12 hours.  When to seek medical advice  Call your healthcare provider right away if any of these occur:  · Drowsiness gets worse  · Weakness or dizziness gets worse  · Repeated vomiting  · You can't be awakened   Date Last Reviewed: 10/18/2016  © 9801-1718 [employer]. [address] [place] [address], [place] [address]. All rights reserved. This information is not intended as a substitute for professional medical care. Always follow your healthcare professional's instructions.      Home care instructions  Apply ice pack to the injection site for 20 minutes periods for the first 24 hrs for soreness/discomfort at injection site DO NOT USE HEAT FOR 24 HOURS  Keep site clean and dry for 24 hours, remove bandaid when desired  Do not drive until tomorrow  Take care when walking after a lumbar injection  Avoid strenuous activities for 2 days  Make take 2 weeks to feel the full effects   Resume home medication  as prescribed today  Resume Aspirin, Plavix, or Coumadin the day after the procedure unless otherwise instructed.    SEE IMMEDIATE MEDICAL HELP FOR:  Severe increase in your usual pain or appearance of new pain  Prolonged or increasing weakness or numbness in the legs or arms  Drainage, redness, active bleeding, or increased swelling at the injection site  Temperature over 100.0 degrees F.  Headache that increases when your head is upright and decreases when you lie flat    CALL 911 OR GO DIRECTLY TO EMERGENCY DEPARTMENT FOR:  Shortness of breath, chest pain, or problems breathing

## 2018-09-13 NOTE — OP NOTE
PROCEDURE:  1.  Radiofrequency ablation of the left L5 dorsal ramus/medial branch  2.  Sacroiliac joint ablation, lateral branches of the left S1, S2, S3     DIAGNOSIS: Right sided sacroiliitis.      Post op diagnosis: same     PHYSICIAN: Ivan King MD     MEDICATIONS INJECTED:  1.  2% lidocaine 1 mL to each site to anesthetize prior to ablation  2.  From a mixture of Methylprednisone 80mg and 9ml Bupivacaine 0.25%, 1 mL was injected into each introducer after ablation for prevention of neuritis.     LOCAL ANESTHETIC USED: Lidocaine 1%,3ml total at each location.     SEDATION MEDICATIONS: 4mg versed, 25mcg fentanyl     ESTIMATED BLOOD LOSS: none     COMPLICATIONS: none     The patient was brought into the fluoroscopy suite and carefully assisted into the prone position on the fluoroscopy table and allowed to adjust to a position of comfort.  A grounding pad was placed on the back thigh.  The low back and buttocks were widely prepped with ChloraPrep solution, allowed to air dry and draped in standard sterile surgical fashion. After determining the needle insertion sites using fluoroscopy, local anesthesia was provided by making a small wheal in the skin with a 25 gauge 1.5 in needl e and then injected slightly deeper.     On the left side, a 17 gauge 75mm radiofrequency introducer needle was placed to the plan and a time a target, guided with intermittent fluoroscopy with a perpendicular approach to place at the sacral ala.  Needle tip position was confirmed in AP and oblique views. Then, the same size needles were to the lateral side of the S1, S2 and S3 foramen, approximately 7-10 mm lateral to the foramen. The 1st positioning of these needles was to the 2:00 position of each foramen. The stylette was withdrawn from the introducer at L5, S1, S2 and S3 and radiofrequency probes with 4 mm active tip fully inserted into the introducer.  A lateral view was obtained for standard reference.  At each site, the  lateral branch (or medial branch in the case of L5) was stimulated at 2 Hz to a maximum of 2.5 volts to ensure safe needle and electrode placement.     Each target was anesthetized with about 1 mL of 2% lidocaine for lesioning then each target was lesion at 80 degree C for 2 min and 30 sec.  Tissue impedances were noted to be between 250-500 Ohms.  After lesioning, 1 mL of solution described above was injected into each introducer to prevent neuritis.  The needles were removed and bandages placed over the needle placement sites and the patient returned to the supine position on stretcher and transported to the recovery room without hemodynamic, neurologic or allergic reactions.     The patient was monitored after the procedure.  The patient was given post procedure and discharge instructions to follow at home. The patient was discharged in a stable condition

## 2018-09-13 NOTE — DISCHARGE SUMMARY
[facility]  Discharge Note  Short Stay    Admit Date: 9/13/2018    Discharge Date: 9/13/2018    Attending Physician: Ivan King MD     Discharge Provider: Ivan King    Diagnoses:  Active Hospital Problems    Diagnosis  POA    *Pain of left sacroiliac joint [M53.3]  Yes      Resolved Hospital Problems   No resolved problems to display.       Discharged Condition: good    Final Diagnoses: Pain of left sacroiliac joint [M53.3]    Disposition: Home or Self Care    Hospital Course: no complications, uneventful    Outcome of Hospitalization, Treatment, Procedure, or Surgery:  Patient was admitted for outpatient procedure. The patient underwent procedure without complications and are discharged home    Follow up/Patient Instructions:  Follow up as scheduled in Pain Management clinic in 3-4 weeks/Patient has received instructions and follow up date and time    Medications:  Continue previous medications    Discharge Procedure Orders   Call MD for:  temperature >100.4     Call MD for:  severe uncontrolled pain     Call MD for:  redness, tenderness, or signs of infection (pain, swelling, redness, odor or green/yellow discharge around incision site)     Call MD for:  severe persistent headache     No dressing needed         Discharge Procedure Orders (must include Diet, Follow-up, Activity):   Discharge Procedure Orders (must include Diet, Follow-up, Activity)   Call MD for:  temperature >100.4     Call MD for:  severe uncontrolled pain     Call MD for:  redness, tenderness, or signs of infection (pain, swelling, redness, odor or green/yellow discharge around incision site)     Call MD for:  severe persistent headache     No dressing needed

## 2018-10-12 ENCOUNTER — OFFICE VISIT (OUTPATIENT)
Dept: PAIN MEDICINE | Facility: CLINIC | Age: 73
End: 2018-10-12
Payer: MEDICARE

## 2018-10-12 VITALS
OXYGEN SATURATION: 99 % | RESPIRATION RATE: 18 BRPM | DIASTOLIC BLOOD PRESSURE: 72 MMHG | HEART RATE: 64 BPM | SYSTOLIC BLOOD PRESSURE: 157 MMHG | WEIGHT: 178.38 LBS | TEMPERATURE: 96 F | BODY MASS INDEX: 22.9 KG/M2

## 2018-10-12 DIAGNOSIS — M53.3 SACROILIAC JOINT PAIN: Primary | ICD-10-CM

## 2018-10-12 PROCEDURE — 99999 PR PBB SHADOW E&M-EST. PATIENT-LVL V: CPT | Mod: PBBFAC,,, | Performed by: PHYSICIAN ASSISTANT

## 2018-10-12 PROCEDURE — 1101F PT FALLS ASSESS-DOCD LE1/YR: CPT | Mod: CPTII,,, | Performed by: PHYSICIAN ASSISTANT

## 2018-10-12 PROCEDURE — 99212 OFFICE O/P EST SF 10 MIN: CPT | Mod: S$PBB,,, | Performed by: PHYSICIAN ASSISTANT

## 2018-10-12 PROCEDURE — 99215 OFFICE O/P EST HI 40 MIN: CPT | Mod: PBBFAC,PN | Performed by: PHYSICIAN ASSISTANT

## 2018-10-12 NOTE — PROGRESS NOTES
This note was completed with dictation software and grammatical errors may exist.    CC:Back pain    HPI: The patient is a 73-year-old man with a history of diabetes, COPD, CAD on anticoagulation who presents in referral [facility] for continued back pain and bilateral leg pain. He is status post left L5 medial branch and S1, 2 and 3 lateral branch radiofrequency ablation on 09/13/2018 with greater than 75% relief of his left low back and left buttock pain. Now that he has had both sides done, the patient reports being very pleased with the results.  He has some days without any pain but occasionally he wakes up in the mornings with back and buttock pain bilaterally.  However this is not as intense as it was prior to the procedures.  He complains of weakness in his legs.  He denies numbness, bladder or bowel incontinence.    Pain intervention history: He is status post June 2016 L2-L5 PSIF with Dr. Redmond. He initially did have some relief of pain after surgery. He did undergo PT and has had 2 DEEJAY since surgery without benefit.   He had seen Dr. Chang and had undergone some injections, unclear if any of these were sacroiliac joint injections but he denies any relief..  He was previously taking hydrocodone sparingly, over the years when he has had improvement, he has completely discontinued this.  He has been taking Lyrica 75 mg twice a day with unclear relief.  For the last several months his pain is becoming much more severe and is taking this up to 3 times daily.  He states that he does not like taking this but he has been taking it on and off since 1981.  He is status post bilateral sacral iliac joint injections on 6/18/18 with 100% relief lasting 2 weeks, now reporting mild relief. He is status post right L5 medial branch and S1, 2 and 3 lateral branch radiofrequency ablation on 08/09/2018 with almost 100% relief his right low back and right buttock pain. He is status post left L5  medial branch and S1, 2 and 3 lateral branch radiofrequency ablation on 09/13/2018 with greater than 75% relief of his left low back and left buttock pain.     ROS: He reports back pain, leg pain, difficulty sleeping.  Balance of review of systems is negative.    Past Medical History:   Diagnosis Date    Anticoagulant long-term use     eliquis    Arthritis     COPD (chronic obstructive pulmonary disease)     Coronary artery disease     Diabetes mellitus     no longer on meds    Hypertension     Lumbar stenosis without neurogenic claudication     Thyroid disease     hypo       Past Surgical History:   Procedure Laterality Date    [id number] Left 9/13/2018    [facility]    [id number] Right 8/9/2018    [facility] OR    BLOCK-JOINT-SACROILIAC Bilateral 6/18/2018    [facility]    CORONARY ARTERY BYPASS GRAFT      single vessel 1997; 4 vessel 2007; plus 8 stents    CORONARY STENT PLACEMENT      x 8    CYSTOSCOPY N/A 6/6/2016    [facility]    FUSION-LAMINECTOMY-LUMBAR -[id number] PSIF N/A 6/6/2016    [facility]    HERNIA REPAIR  1999    umbilical    INJECTION OF ANESTHETIC AGENT INTO SACROILIAC JOINT Bilateral 6/18/2018    Procedure: BLOCK-JOINT-SACROILIAC;  Surgeon: Ivan King MD;  Location: [facility] OR;  Service: Pain Management;  Laterality: Bilateral;    lumbar decompression      X 3    RHIZOTOMY W/ RADIOFREQUENCY ABLATION         Social History     Socioeconomic History    Marital status:      Spouse name: None    Number of children: None    Years of education: None    Highest education level: None   Social Needs    Financial resource strain: None    Food insecurity - worry: None    Food insecurity - inability: None    Transportation needs - medical: None     Transportation needs - non-medical: None   Occupational History    None   Tobacco Use    Smoking status: Former Smoker     Packs/day: 3.00     Years: 30.00     Pack years: 90.00     Last attempt to quit:      Years since quittin.7    Smokeless tobacco: Never Used   Substance and Sexual Activity    Alcohol use: Yes     Comment: occasional    Drug use: No    Sexual activity: None   Other Topics Concern    None   Social History Narrative    None         Medications/Allergies: See med card    Vitals:    10/12/18 1129   BP: (!) 157/72   Pulse: 64   Resp: 18   Temp: 96.4 °F (35.8 °C)   TempSrc: Oral   SpO2: 99%   Weight: 80.9 kg (178 lb 5.6 oz)   PainSc:   4   PainLoc: Back         Physical exam:  Gen: A and O x3, pleasant, well-groomed  Skin: No rashes or obvious lesions  HEENT: PERRLA, no obvious deformities on ears or in canals. Trachea midline.  CVS: Regular rate and rhythm, normal palpable pulses.  Resp:No increased work of breathing, symmetrical chest rise.  Abdomen: Soft, NT/ND.  Musculoskeletal:  No antalgic gait.  Has difficulty moving from sitting to standing without using his arms.    Neuro:  Lower extremities: 5/5 strength bilaterally, except for left hip flexion 4/5.  Reflexes: Patellar 0+, Achilles 0+ bilaterally.  Sensory:  Intact and symmetrical to light touch and pinprick in L2-S1 dermatomes bilaterally, hypoesthesia in the lower legs bilaterally.    Lumbar spine:  Lumbar spine: Range of motion is moderately reduced with both flexion and extension without pain.  Roni's test is negative bilaterally.  Supine straight leg raise is negative bilaterally.     Internal and external rotation of the hip causes no increased pain on either side.  Myofascial exam: No tenderness to palpation across lumbar paraspinous muscles.     Imagin16 Xray L-spine  Findings: Postoperative changes related to prior posterior instrumented fusion L2-L5 again demonstrated.  No definite evidence of  hardware failure or loosening.  Minimal retrolisthesis of L2 on L3 is unchanged.  Vertebral body heights are within normal limits.  Mild diffuse disc height loss throughout the fused levels is unchanged with associated osteophyte production.  Disc heights at remaining levels remain preserved. Posterior elements appear grossly intact. No acute fractures or subluxations are demonstrated.  The remaining visualized osseous and soft tissue structures demonstrate no appreciable abnormality.    MRI lumbar spine 5/12/17, outside institution: I reviewed the images personally.  There is instrumented fusion with posterior hardware at L2-S1.  Canal is patent at all levels, there is moderate foraminal stenosis to severe foraminal stenosis at multiple levels particularly at L2/3 through L4/5.    EMG 8/4/17:  Every muscle tested in the lower extremities showed neurogenic chronic moderate to severe changes. The MUAPs were large and the recruitment pattern was reduced indicating a process that is old and chronic. This pattern is commonly seen in spinal canal stenosis.  There was NO ACTIVE denervation noted in any of the muscles tested.   There was also a superimposed moderate sensorimotor predominantly axonal neuropathy.    Assessment:  The patient is a 73-year-old man with a history of diabetes, COPD, CAD on anticoagulation who presents in referral [facility] for continued back pain and bilateral leg pain.    1. Sacroiliac joint pain       Plan:  1.  The patient is pleased with the results of his procedures and will follow up on an as-needed basis. We can repeat in the future if necessary.

## 2018-11-05 ENCOUNTER — TELEPHONE (OUTPATIENT)
Dept: PAIN MEDICINE | Facility: CLINIC | Age: 73
End: 2018-11-05

## 2018-11-05 RX ORDER — CYCLOBENZAPRINE HCL 5 MG
5 TABLET ORAL 2 TIMES DAILY PRN
Qty: 20 TABLET | Refills: 0 | Status: SHIPPED | OUTPATIENT
Start: 2018-11-05 | End: 2018-11-15

## 2018-11-05 NOTE — TELEPHONE ENCOUNTER
Spoke with the patient and was out side working in the yard and pulled the muscle in his right leg. The muscle is having cramping and spasms. Requesting a muscle relaxer. Please advise.

## 2018-11-05 NOTE — TELEPHONE ENCOUNTER
Please let patient know that I sent Flexeril to his pharmacy.  It may make him drowsy and I do not suggest driving after he takes this medication.

## 2018-11-05 NOTE — TELEPHONE ENCOUNTER
----- Message from Jessica Mario sent at 11/5/2018 12:16 PM CST -----  Contact: self [phone]  States that he pulled a muscle in his back and would like a rx for a muscle relaxer. Please call back at [phone]//thank you acc

## 2019-08-24 NOTE — DISCHARGE INSTRUCTIONS
Home care instructions  Apply ice pack to the injection site for 20 minutes periods for the first 24 hrs for soreness/discomfort at injection site DO NOT USE HEAT FOR 24 HOURS  Keep site clean and dry for 24 hours, remove bandaid when desired  Do not drive until tomorrow  Take care when walking after a lumbar injection  Avoid strenuous activities for 2 days  Make take 2 weeks to feel the full effects   Resume home medication as prescribed today  Resume Aspirin, Plavix, or Coumadin the day after the procedure unless otherwise instructed.    SEE IMMEDIATE MEDICAL HELP FOR:  Severe increase in your usual pain or appearance of new pain  Prolonged or increasing weakness or numbness in the legs or arms  Drainage, redness, active bleeding, or increased swelling at the injection site  Temperature over 100.0 degrees F.  Headache that increases when your head is upright and decreases when you lie flat    CALL 911 OR GO DIRECTLY TO EMERGENCY DEPARTMENT FOR:  Shortness of breath, chest pain, or problems breathing      Recovery After Procedural Sedation (Adult)  You have been given medicine by vein to make you sleep during your surgery. This may have included both a pain medicine and sleeping medicine. Most of the effects have worn off. But you may still have some drowsiness for the next 6 to 8 hours.  Home care  Follow these guidelines when you get home:  · For the next 8 hours, you should be watched by a responsible adult. This person should make sure your condition is not getting worse.  · Don't drink any alcohol for the next 24 hours.  · Don't drive, operate dangerous machinery, or make important business or personal decisions during the next 24 hours.  Note: Your healthcare provider may tell you not to take any medicine by mouth for pain or sleep in the next 4 hours. These medicines may react with the medicines you were given in the hospital. This could cause a much stronger response than usual.  Follow-up care  Follow up  with your healthcare provider if you are not alert and back to your usual level of activity within 12 hours.  When to seek medical advice  Call your healthcare provider right away if any of these occur:  · Drowsiness gets worse  · Weakness or dizziness gets worse  · Repeated vomiting  · You can't be awakened   Date Last Reviewed: 10/18/2016  © 6923-7381 [employer]. [address] [place] [address], [place] [address]. All rights reserved. This information is not intended as a substitute for professional medical care. Always follow your healthcare professional's instructions.         Abdominal Pain, N/V/D

## 2019-10-15 ENCOUNTER — OFFICE VISIT (OUTPATIENT)
Dept: PAIN MEDICINE | Facility: CLINIC | Age: 74
End: 2019-10-15
Payer: MEDICARE

## 2019-10-15 VITALS
HEART RATE: 64 BPM | DIASTOLIC BLOOD PRESSURE: 88 MMHG | HEIGHT: 74 IN | WEIGHT: 183.88 LBS | BODY MASS INDEX: 23.6 KG/M2 | SYSTOLIC BLOOD PRESSURE: 174 MMHG

## 2019-10-15 DIAGNOSIS — M53.3 SACROILIAC JOINT PAIN: Primary | ICD-10-CM

## 2019-10-15 PROCEDURE — 99213 PR OFFICE/OUTPT VISIT, EST, LEVL III, 20-29 MIN: ICD-10-PCS | Mod: S$GLB,,, | Performed by: PHYSICIAN ASSISTANT

## 2019-10-15 PROCEDURE — 99999 PR PBB SHADOW E&M-EST. PATIENT-LVL V: ICD-10-PCS | Mod: PBBFAC,,, | Performed by: PHYSICIAN ASSISTANT

## 2019-10-15 PROCEDURE — 99213 OFFICE O/P EST LOW 20 MIN: CPT | Mod: S$GLB,,, | Performed by: PHYSICIAN ASSISTANT

## 2019-10-15 PROCEDURE — 99999 PR PBB SHADOW E&M-EST. PATIENT-LVL V: CPT | Mod: PBBFAC,,, | Performed by: PHYSICIAN ASSISTANT

## 2019-10-15 PROCEDURE — 1101F PR PT FALLS ASSESS DOC 0-1 FALLS W/OUT INJ PAST YR: ICD-10-PCS | Mod: CPTII,S$GLB,, | Performed by: PHYSICIAN ASSISTANT

## 2019-10-15 PROCEDURE — 1101F PT FALLS ASSESS-DOCD LE1/YR: CPT | Mod: CPTII,S$GLB,, | Performed by: PHYSICIAN ASSISTANT

## 2019-10-15 RX ORDER — DICLOFENAC SODIUM 10 MG/G
2 GEL TOPICAL 3 TIMES DAILY PRN
Qty: 1 TUBE | Refills: 5 | Status: SHIPPED | OUTPATIENT
Start: 2019-10-15

## 2019-10-15 RX ORDER — SODIUM CHLORIDE, SODIUM LACTATE, POTASSIUM CHLORIDE, CALCIUM CHLORIDE 600; 310; 30; 20 MG/100ML; MG/100ML; MG/100ML; MG/100ML
INJECTION, SOLUTION INTRAVENOUS CONTINUOUS
Status: CANCELLED | OUTPATIENT
Start: 2019-10-29

## 2019-10-16 NOTE — H&P (VIEW-ONLY)
This note was completed with dictation software and grammatical errors may exist.    CC:Back pain    HPI: The patient is a 74-year-old man with a history of diabetes, COPD, CAD on anticoagulation who presents in referral [facility] for continued back pain and bilateral leg pain. He returns in follow-up today with right low back and buttock pain. This has been slowly coming back but has gotten worse over the past couple of weeks.  He feels that this is the same pain he had in the past just more intense.  He currently denies any left-sided pain.  He reports intermittent numbness in his right leg.  He denies weakness or incontinence.    Pain intervention history: He is status post June 2016 L2-L5 PSIF with Dr. Redmond. He initially did have some relief of pain after surgery. He did undergo PT and has had 2 DEEJAY since surgery without benefit.   He had seen Dr. Chang and had undergone some injections, unclear if any of these were sacroiliac joint injections but he denies any relief..  He was previously taking hydrocodone sparingly, over the years when he has had improvement, he has completely discontinued this.  He has been taking Lyrica 75 mg twice a day with unclear relief.  For the last several months his pain is becoming much more severe and is taking this up to 3 times daily.  He states that he does not like taking this but he has been taking it on and off since 1981.  He is status post bilateral sacral iliac joint injections on 6/18/18 with 100% relief lasting 2 weeks, now reporting mild relief. He is status post right L5 medial branch and S1, 2 and 3 lateral branch radiofrequency ablation on 08/09/2018 with almost 100% relief his right low back and right buttock pain. He is status post left L5 medial branch and S1, 2 and 3 lateral branch radiofrequency ablation on 09/13/2018 with greater than 75% relief of his left low back and left buttock pain.     ROS: He reports back pain, leg pain,  difficulty sleeping.  Balance of review of systems is negative.    Past Medical History:   Diagnosis Date    Anticoagulant long-term use     eliquis    Arthritis     COPD (chronic obstructive pulmonary disease)     Coronary artery disease     Diabetes mellitus     no longer on meds    Hypertension     Lumbar stenosis without neurogenic claudication     Thyroid disease     hypo       Past Surgical History:   Procedure Laterality Date    CORONARY ARTERY BYPASS GRAFT      single vessel ; 4 vessel ; plus 8 stents    CORONARY STENT PLACEMENT      x 8    HERNIA REPAIR      umbilical    INJECTION OF ANESTHETIC AGENT INTO SACROILIAC JOINT Bilateral 2018    Procedure: BLOCK-JOINT-SACROILIAC;  Surgeon: Ivan King MD;  Location: [facility] OR;  Service: Pain Management;  Laterality: Bilateral;    lumbar decompression      X 3    RHIZOTOMY W/ RADIOFREQUENCY ABLATION         Social History     Socioeconomic History    Marital status:      Spouse name: Not on file    Number of children: Not on file    Years of education: Not on file    Highest education level: Not on file   Occupational History    Not on file   Social Needs    Financial resource strain: Not on file    Food insecurity:     Worry: Not on file     Inability: Not on file    Transportation needs:     Medical: Not on file     Non-medical: Not on file   Tobacco Use    Smoking status: Former Smoker     Packs/day: 3.00     Years: 30.00     Pack years: 90.00     Last attempt to quit:      Years since quittin.8    Smokeless tobacco: Never Used   Substance and Sexual Activity    Alcohol use: Yes     Comment: occasional    Drug use: No    Sexual activity: Not on file   Lifestyle    Physical activity:     Days per week: Not on file     Minutes per session: Not on file    Stress: Not on file   Relationships    Social connections:     Talks on phone: Not on file     Gets together: Not on file     Attends  "Hindu service: Not on file     Active member of club or organization: Not on file     Attends meetings of clubs or organizations: Not on file     Relationship status: Not on file   Other Topics Concern    Not on file   Social History Narrative    Not on file         Medications/Allergies: See med card    Vitals:    10/15/19 1400   BP: (!) 174/88   Pulse: 64   Weight: 83.4 kg (183 lb 13.8 oz)   Height: 6' 2" (1.88 m)   PainSc: 10-Worst pain ever   PainLoc: Back         Physical exam:  Gen: A and O x3, pleasant, well-groomed  Skin: No rashes or obvious lesions  HEENT: PERRLA, no obvious deformities on ears or in canals. Trachea midline.  CVS: Regular rate and rhythm, normal palpable pulses.  Resp:No increased work of breathing, symmetrical chest rise.  Abdomen: Soft, NT/ND.  Musculoskeletal:  Cautious gait, ambulating with a cane.  Has difficulty moving from sitting to standing without using his arms.    Neuro:  Lower extremities: 5/5 strength bilaterally, except for left hip flexion 4/5.  Reflexes: Patellar 0+, Achilles 0+ bilaterally.  Sensory:  Intact and symmetrical to light touch and pinprick in L2-S1 dermatomes bilaterally, hypoesthesia in the lower legs bilaterally.    Lumbar spine:  Lumbar spine: Range of motion is moderately reduced with both flexion and extension without increased pain.  Roni's test causes right low back and right buttock pain.  Supine straight leg raise is negative bilaterally.     Internal and external rotation of the hip causes no increased pain on either side.  Myofascial exam: No tenderness to palpation across lumbar paraspinous muscles.     Imagin16 Xray L-spine  Findings: Postoperative changes related to prior posterior instrumented fusion L2-L5 again demonstrated.  No definite evidence of hardware failure or loosening.  Minimal retrolisthesis of L2 on L3 is unchanged.  Vertebral body heights are within normal limits.  Mild diffuse disc height loss throughout the " fused levels is unchanged with associated osteophyte production.  Disc heights at remaining levels remain preserved. Posterior elements appear grossly intact. No acute fractures or subluxations are demonstrated.  The remaining visualized osseous and soft tissue structures demonstrate no appreciable abnormality.    MRI lumbar spine 5/12/17, outside institution: I reviewed the images personally.  There is instrumented fusion with posterior hardware at L2-S1.  Canal is patent at all levels, there is moderate foraminal stenosis to severe foraminal stenosis at multiple levels particularly at L2/3 through L4/5.    EMG 8/4/17:  Every muscle tested in the lower extremities showed neurogenic chronic moderate to severe changes. The MUAPs were large and the recruitment pattern was reduced indicating a process that is old and chronic. This pattern is commonly seen in spinal canal stenosis.  There was NO ACTIVE denervation noted in any of the muscles tested.   There was also a superimposed moderate sensorimotor predominantly axonal neuropathy.    Assessment:  The patient is a 74-year-old man with a history of diabetes, COPD, CAD on anticoagulation who presents in referral [facility] for continued back pain and bilateral leg pain.    1. Sacroiliac joint pain  Vital signs    Place 18-22 [facility] peripheral IV     Verify informed consent    Notify physician     Notify physician     Notify physician (specify)    Diet NPO    Case Request Operating Room: Radiofrequency Ablation, Nerve, Spinal, Lumbar, Medial Branch, L5, and lateral branches S1, S2, S3    Place in Outpatient    lactated ringers infusion     Plan:  1.  The patient had over 1 year of relief following the right-sided SI joint RFA.  I will set him up to repeat right L5 medial branch and S1, 2 and 3 lateral branch radiofrequency ablation.  2.  I provided a prescription for Voltaren gel.  3.  Follow-up in 4 weeks postprocedure or sooner as  needed.

## 2019-10-16 NOTE — PROGRESS NOTES
This note was completed with dictation software and grammatical errors may exist.    CC:Back pain    HPI: The patient is a 74-year-old man with a history of diabetes, COPD, CAD on anticoagulation who presents in referral [facility] for continued back pain and bilateral leg pain. He returns in follow-up today with right low back and buttock pain. This has been slowly coming back but has gotten worse over the past couple of weeks.  He feels that this is the same pain he had in the past just more intense.  He currently denies any left-sided pain.  He reports intermittent numbness in his right leg.  He denies weakness or incontinence.    Pain intervention history: He is status post June 2016 L2-L5 PSIF with Dr. Redmond. He initially did have some relief of pain after surgery. He did undergo PT and has had 2 DEEJAY since surgery without benefit.   He had seen Dr. Chang and had undergone some injections, unclear if any of these were sacroiliac joint injections but he denies any relief..  He was previously taking hydrocodone sparingly, over the years when he has had improvement, he has completely discontinued this.  He has been taking Lyrica 75 mg twice a day with unclear relief.  For the last several months his pain is becoming much more severe and is taking this up to 3 times daily.  He states that he does not like taking this but he has been taking it on and off since 1981.  He is status post bilateral sacral iliac joint injections on 6/18/18 with 100% relief lasting 2 weeks, now reporting mild relief. He is status post right L5 medial branch and S1, 2 and 3 lateral branch radiofrequency ablation on 08/09/2018 with almost 100% relief his right low back and right buttock pain. He is status post left L5 medial branch and S1, 2 and 3 lateral branch radiofrequency ablation on 09/13/2018 with greater than 75% relief of his left low back and left buttock pain.     ROS: He reports back pain, leg pain,  difficulty sleeping.  Balance of review of systems is negative.    Past Medical History:   Diagnosis Date    Anticoagulant long-term use     eliquis    Arthritis     COPD (chronic obstructive pulmonary disease)     Coronary artery disease     Diabetes mellitus     no longer on meds    Hypertension     Lumbar stenosis without neurogenic claudication     Thyroid disease     hypo       Past Surgical History:   Procedure Laterality Date    CORONARY ARTERY BYPASS GRAFT      single vessel ; 4 vessel ; plus 8 stents    CORONARY STENT PLACEMENT      x 8    HERNIA REPAIR      umbilical    INJECTION OF ANESTHETIC AGENT INTO SACROILIAC JOINT Bilateral 2018    Procedure: BLOCK-JOINT-SACROILIAC;  Surgeon: Ivan King MD;  Location: [facility] OR;  Service: Pain Management;  Laterality: Bilateral;    lumbar decompression      X 3    RHIZOTOMY W/ RADIOFREQUENCY ABLATION         Social History     Socioeconomic History    Marital status:      Spouse name: Not on file    Number of children: Not on file    Years of education: Not on file    Highest education level: Not on file   Occupational History    Not on file   Social Needs    Financial resource strain: Not on file    Food insecurity:     Worry: Not on file     Inability: Not on file    Transportation needs:     Medical: Not on file     Non-medical: Not on file   Tobacco Use    Smoking status: Former Smoker     Packs/day: 3.00     Years: 30.00     Pack years: 90.00     Last attempt to quit:      Years since quittin.8    Smokeless tobacco: Never Used   Substance and Sexual Activity    Alcohol use: Yes     Comment: occasional    Drug use: No    Sexual activity: Not on file   Lifestyle    Physical activity:     Days per week: Not on file     Minutes per session: Not on file    Stress: Not on file   Relationships    Social connections:     Talks on phone: Not on file     Gets together: Not on file     Attends  "Mandaen service: Not on file     Active member of club or organization: Not on file     Attends meetings of clubs or organizations: Not on file     Relationship status: Not on file   Other Topics Concern    Not on file   Social History Narrative    Not on file         Medications/Allergies: See med card    Vitals:    10/15/19 1400   BP: (!) 174/88   Pulse: 64   Weight: 83.4 kg (183 lb 13.8 oz)   Height: 6' 2" (1.88 m)   PainSc: 10-Worst pain ever   PainLoc: Back         Physical exam:  Gen: A and O x3, pleasant, well-groomed  Skin: No rashes or obvious lesions  HEENT: PERRLA, no obvious deformities on ears or in canals. Trachea midline.  CVS: Regular rate and rhythm, normal palpable pulses.  Resp:No increased work of breathing, symmetrical chest rise.  Abdomen: Soft, NT/ND.  Musculoskeletal:  Cautious gait, ambulating with a cane.  Has difficulty moving from sitting to standing without using his arms.    Neuro:  Lower extremities: 5/5 strength bilaterally, except for left hip flexion 4/5.  Reflexes: Patellar 0+, Achilles 0+ bilaterally.  Sensory:  Intact and symmetrical to light touch and pinprick in L2-S1 dermatomes bilaterally, hypoesthesia in the lower legs bilaterally.    Lumbar spine:  Lumbar spine: Range of motion is moderately reduced with both flexion and extension without increased pain.  Roni's test causes right low back and right buttock pain.  Supine straight leg raise is negative bilaterally.     Internal and external rotation of the hip causes no increased pain on either side.  Myofascial exam: No tenderness to palpation across lumbar paraspinous muscles.     Imagin16 Xray L-spine  Findings: Postoperative changes related to prior posterior instrumented fusion L2-L5 again demonstrated.  No definite evidence of hardware failure or loosening.  Minimal retrolisthesis of L2 on L3 is unchanged.  Vertebral body heights are within normal limits.  Mild diffuse disc height loss throughout the " fused levels is unchanged with associated osteophyte production.  Disc heights at remaining levels remain preserved. Posterior elements appear grossly intact. No acute fractures or subluxations are demonstrated.  The remaining visualized osseous and soft tissue structures demonstrate no appreciable abnormality.    MRI lumbar spine 5/12/17, outside institution: I reviewed the images personally.  There is instrumented fusion with posterior hardware at L2-S1.  Canal is patent at all levels, there is moderate foraminal stenosis to severe foraminal stenosis at multiple levels particularly at L2/3 through L4/5.    EMG 8/4/17:  Every muscle tested in the lower extremities showed neurogenic chronic moderate to severe changes. The MUAPs were large and the recruitment pattern was reduced indicating a process that is old and chronic. This pattern is commonly seen in spinal canal stenosis.  There was NO ACTIVE denervation noted in any of the muscles tested.   There was also a superimposed moderate sensorimotor predominantly axonal neuropathy.    Assessment:  The patient is a 74-year-old man with a history of diabetes, COPD, CAD on anticoagulation who presents in referral [facility] for continued back pain and bilateral leg pain.    1. Sacroiliac joint pain  Vital signs    Place 18-22 [facility] peripheral IV     Verify informed consent    Notify physician     Notify physician     Notify physician (specify)    Diet NPO    Case Request Operating Room: Radiofrequency Ablation, Nerve, Spinal, Lumbar, Medial Branch, L5, and lateral branches S1, S2, S3    Place in Outpatient    lactated ringers infusion     Plan:  1.  The patient had over 1 year of relief following the right-sided SI joint RFA.  I will set him up to repeat right L5 medial branch and S1, 2 and 3 lateral branch radiofrequency ablation.  2.  I provided a prescription for Voltaren gel.  3.  Follow-up in 4 weeks postprocedure or sooner as  needed.

## 2019-10-28 DIAGNOSIS — M51.36 DDD (DEGENERATIVE DISC DISEASE), LUMBAR: Primary | ICD-10-CM

## 2019-10-29 ENCOUNTER — HOSPITAL ENCOUNTER (OUTPATIENT)
Dept: RADIOLOGY | Facility: HOSPITAL | Age: 74
Discharge: HOME | End: 2019-10-29
Attending: ANESTHESIOLOGY | Admitting: ANESTHESIOLOGY
Payer: MEDICARE

## 2019-10-29 ENCOUNTER — HOSPITAL ENCOUNTER (OUTPATIENT)
Facility: HOSPITAL | Age: 74
Discharge: HOME | End: 2019-10-29
Attending: ANESTHESIOLOGY | Admitting: ANESTHESIOLOGY
Payer: MEDICARE

## 2019-10-29 VITALS
RESPIRATION RATE: 16 BRPM | DIASTOLIC BLOOD PRESSURE: 84 MMHG | TEMPERATURE: 98 F | BODY MASS INDEX: 23.1 KG/M2 | SYSTOLIC BLOOD PRESSURE: 147 MMHG | WEIGHT: 180 LBS | HEART RATE: 64 BPM | OXYGEN SATURATION: 95 % | HEIGHT: 74 IN

## 2019-10-29 DIAGNOSIS — M51.36 DDD (DEGENERATIVE DISC DISEASE), LUMBAR: ICD-10-CM

## 2019-10-29 DIAGNOSIS — M53.3 SACROILIAC JOINT PAIN: Primary | ICD-10-CM

## 2019-10-29 PROCEDURE — 64636 PR DESTROY L/S FACET JNT ADDL: ICD-10-PCS | Mod: RT,,, | Performed by: ANESTHESIOLOGY

## 2019-10-29 PROCEDURE — 64635 PR DESTROY LUMB/SAC FACET JNT: ICD-10-PCS | Mod: RT,,, | Performed by: ANESTHESIOLOGY

## 2019-10-29 PROCEDURE — 99152 PR MOD CONSCIOUS SEDATION, SAME PHYS, 5+ YRS, FIRST 15 MIN: ICD-10-PCS | Mod: ,,, | Performed by: ANESTHESIOLOGY

## 2019-10-29 PROCEDURE — 64635 DESTROY LUMB/SAC FACET JNT: CPT | Mod: PO | Performed by: ANESTHESIOLOGY

## 2019-10-29 PROCEDURE — 76000 FLUOROSCOPY <1 HR PHYS/QHP: CPT | Mod: TC,PO

## 2019-10-29 PROCEDURE — 64635 DESTROY LUMB/SAC FACET JNT: CPT | Mod: RT,,, | Performed by: ANESTHESIOLOGY

## 2019-10-29 PROCEDURE — 99152 MOD SED SAME PHYS/QHP 5/>YRS: CPT | Mod: ,,, | Performed by: ANESTHESIOLOGY

## 2019-10-29 PROCEDURE — 63600175 PHARM REV CODE 636 W HCPCS: Mod: PO | Performed by: ANESTHESIOLOGY

## 2019-10-29 PROCEDURE — 25000003 PHARM REV CODE 250: Mod: PO | Performed by: ANESTHESIOLOGY

## 2019-10-29 PROCEDURE — 64636 DESTROY L/S FACET JNT ADDL: CPT | Mod: PO | Performed by: ANESTHESIOLOGY

## 2019-10-29 PROCEDURE — 64636 DESTROY L/S FACET JNT ADDL: CPT | Mod: RT,,, | Performed by: ANESTHESIOLOGY

## 2019-10-29 RX ORDER — MIDAZOLAM HYDROCHLORIDE 1 MG/ML
INJECTION INTRAMUSCULAR; INTRAVENOUS
Status: DISCONTINUED | OUTPATIENT
Start: 2019-10-29 | End: 2019-10-29 | Stop reason: HOSPADM

## 2019-10-29 RX ORDER — LIDOCAINE HYDROCHLORIDE 20 MG/ML
INJECTION, SOLUTION EPIDURAL; INFILTRATION; INTRACAUDAL; PERINEURAL
Status: DISCONTINUED | OUTPATIENT
Start: 2019-10-29 | End: 2019-10-29 | Stop reason: HOSPADM

## 2019-10-29 RX ORDER — LIDOCAINE HYDROCHLORIDE 10 MG/ML
INJECTION, SOLUTION EPIDURAL; INFILTRATION; INTRACAUDAL; PERINEURAL
Status: DISCONTINUED | OUTPATIENT
Start: 2019-10-29 | End: 2019-10-29 | Stop reason: HOSPADM

## 2019-10-29 RX ORDER — METHYLPREDNISOLONE ACETATE 40 MG/ML
INJECTION, SUSPENSION INTRA-ARTICULAR; INTRALESIONAL; INTRAMUSCULAR; SOFT TISSUE
Status: DISCONTINUED | OUTPATIENT
Start: 2019-10-29 | End: 2019-10-29 | Stop reason: HOSPADM

## 2019-10-29 RX ORDER — FENTANYL CITRATE 50 UG/ML
INJECTION, SOLUTION INTRAMUSCULAR; INTRAVENOUS
Status: DISCONTINUED | OUTPATIENT
Start: 2019-10-29 | End: 2019-10-29 | Stop reason: HOSPADM

## 2019-10-29 RX ORDER — SODIUM CHLORIDE, SODIUM LACTATE, POTASSIUM CHLORIDE, CALCIUM CHLORIDE 600; 310; 30; 20 MG/100ML; MG/100ML; MG/100ML; MG/100ML
INJECTION, SOLUTION INTRAVENOUS CONTINUOUS
Status: DISCONTINUED | OUTPATIENT
Start: 2019-10-29 | End: 2019-10-29 | Stop reason: HOSPADM

## 2019-10-29 RX ADMIN — SODIUM CHLORIDE, SODIUM LACTATE, POTASSIUM CHLORIDE, AND CALCIUM CHLORIDE: .6; .31; .03; .02 INJECTION, SOLUTION INTRAVENOUS at 01:10

## 2019-10-29 NOTE — DISCHARGE SUMMARY
[facility]  Discharge Note  Short Stay    Admit Date: 10/29/2019    Discharge Date: 10/29/2019    Attending Physician: Ivan King MD     Discharge Provider: Ivan King    Diagnoses:  Active Hospital Problems    Diagnosis  POA    *Sacroiliac joint pain [M53.3]  Yes      Resolved Hospital Problems   No resolved problems to display.       Discharged Condition: good    Final Diagnoses: Sacroiliac joint pain [M53.3]    Disposition: Home or Self Care    Hospital Course: no complications, uneventful    Outcome of Hospitalization, Treatment, Procedure, or Surgery:  Patient was admitted for outpatient procedure. The patient underwent procedure without complications and are discharged home    Follow up/Patient Instructions:  Follow up as scheduled in Pain Management clinic in 3-4 weeks/Patient has received instructions and follow up date and time    Medications:  Continue previous medications    Discharge Procedure Orders   Call MD for:  temperature >100.4     Call MD for:  severe uncontrolled pain     Call MD for:  redness, tenderness, or signs of infection (pain, swelling, redness, odor or green/yellow discharge around incision site)     Call MD for:  severe persistent headache     No dressing needed         Discharge Procedure Orders (must include Diet, Follow-up, Activity):   Discharge Procedure Orders (must include Diet, Follow-up, Activity)   Call MD for:  temperature >100.4     Call MD for:  severe uncontrolled pain     Call MD for:  redness, tenderness, or signs of infection (pain, swelling, redness, odor or green/yellow discharge around incision site)     Call MD for:  severe persistent headache     No dressing needed

## 2019-10-29 NOTE — INTERVAL H&P NOTE
The patient has been examined and the H&P has been reviewed:    I concur with the findings and no changes have occurred since H&P was written.    Anesthesia/Surgery risks, benefits and alternative options discussed and understood by patient/family.    ASA 2, mallampati 2      There are no hospital problems to display for this patient.

## 2019-10-29 NOTE — DISCHARGE INSTRUCTIONS
PAIN MANAGEMENT    Home care instructions   Apply ice pack to the injection site for 20 minute prior for the first 24 hours for soreness/discomfort at injection site   DO NOT USE HEAT FOR 24 HOURS   Keep site clean and dry for 24 hours, remove bandaid when desired   Do not drive until tomorrow  Take care when walking after a lumbar injection     STEROIDS OR RADIOFREQUENCY    May take 10-14 days for full effects  Avoid strenuous exercises for 2 days  Resume Aspirin, Plavix, or Coumadin the day after the procedure unless other wise instructed  Resume home medication as prescribed today    CALL PHYSICIAN FOR:   Severe increase in your usual pain or appearance of new pain   Prolonged or increasing weakness or numbness in the legs or arms   Fever greater then 100 degrees F..   Drainage from the incision site, redness, active bleeding or increased swelling at the injection site   Headache that increases when your head is upright and decreases when you lie flat    FOR EMERGENCIES:   Go directly to Emergency Department for Shortness of breath, chest pain, or problems breathing      Recovery After Procedural Sedation (Adult)  You have been given medicine by vein to make you sleep during your surgery. This may have included both a pain medicine and sleeping medicine. Most of the effects have worn off. But you may still have some drowsiness for the next 6 to 8 hours.  Home care  Follow these guidelines when you get home:  · For the next 8 hours, you should be watched by a responsible adult. This person should make sure your condition is not getting worse.  · Don't drink any alcohol for the next 24 hours.  · Don't drive, operate dangerous machinery, or make important business or personal decisions during the next 24 hours.  Note: Your healthcare provider may tell you not to take any medicine by mouth for pain or sleep in the next 4 hours. These medicines may react with the medicines you were given in the hospital. This  could cause a much stronger response than usual.  Follow-up care  Follow up with your healthcare provider if you are not alert and back to your usual level of activity within 12 hours.  When to seek medical advice  Call your healthcare provider right away if any of these occur:  · Drowsiness gets worse  · Weakness or dizziness gets worse  · Repeated vomiting  · You can't be awakened   Date Last Reviewed: 10/18/2016  © 1371-8589 [employer]. [address] [place] [address], [place] [address]. All rights reserved. This information is not intended as a substitute for professional medical care. Always follow your healthcare professional's instructions.

## 2019-10-29 NOTE — OP NOTE
10/29/2019    PROCEDURE:  1.  Radiofrequency ablation of the right L5 dorsal ramus/medial branch  2.  Sacroiliac joint ablation, lateral branches of the right S1, S2, S3     DIAGNOSIS: right sided sacroiliitis.      Post op diagnosis: same     PHYSICIAN: Ivan King MD     MEDICATIONS INJECTED:  1.  2% lidocaine 1 mL to each site to anesthetize prior to ablation  2.  From a mixture of Methylprednisone 80mg and 9ml Bupivacaine 0.25%, 1 mL was injected into each introducer after ablation for prevention of neuritis.     LOCAL ANESTHETIC USED: Lidocaine 1%,3ml total at each location.     SEDATION MEDICATIONS: 2mg versed, 25mcg fentanyl     ESTIMATED BLOOD LOSS: none     COMPLICATIONS: none     The patient was brought into the fluoroscopy suite and carefully assisted into the prone position on the fluoroscopy table and allowed to adjust to a position of comfort.  A grounding pad was placed on the back thigh.  The low back and buttocks were widely prepped with ChloraPrep solution, allowed to air dry and draped in standard sterile surgical fashion. After determining the needle insertion sites using fluoroscopy, local anesthesia was provided by making a small wheal in the skin with a 25 gauge 1.5 in needl e and then injected slightly deeper.     On the right side, a 17 gauge 75mm radiofrequency introducer needle was placed to the plan and a time a target, guided with intermittent fluoroscopy with a perpendicular approach to place at the sacral ala.  Needle tip position was confirmed in AP and oblique views. Then, the same size needles were to the lateral side of the S1, S2 and S3 foramen, approximately 7-10 mm lateral to the foramen. The 1st positioning of these needles was to the 2:00 position of each foramen. The stylette was withdrawn from the introducer at L5, S1, S2 and S3 and radiofrequency probes with 4 mm active tip fully inserted into the introducer.  A lateral view was obtained for standard reference.  At  each site, the lateral branch (or medial branch in the case of L5) was stimulated at 2 Hz to a maximum of 2.5 volts to ensure safe needle and electrode placement.     Each target was anesthetized with about 1 mL of 2% lidocaine for lesioning then each target was lesion at 80 degree C for 2 min and 30 sec.  Tissue impedances were noted to be between 250-500 Ohms.  After lesioning, 1 mL of solution described above was injected into each introducer to prevent neuritis.  The needles were removed and bandages placed over the needle placement sites and the patient returned to the supine position on stretcher and transported to the recovery room without hemodynamic, neurologic or allergic reactions.     The patient was monitored after the procedure.  The patient was given post procedure and discharge instructions to follow at home. The patient was discharged in a stable condition

## 2019-11-27 ENCOUNTER — OFFICE VISIT (OUTPATIENT)
Dept: PAIN MEDICINE | Facility: CLINIC | Age: 74
End: 2019-11-27
Payer: MEDICARE

## 2019-11-27 VITALS
OXYGEN SATURATION: 95 % | TEMPERATURE: 96 F | DIASTOLIC BLOOD PRESSURE: 77 MMHG | BODY MASS INDEX: 24 KG/M2 | WEIGHT: 186.94 LBS | RESPIRATION RATE: 18 BRPM | SYSTOLIC BLOOD PRESSURE: 165 MMHG | HEART RATE: 62 BPM

## 2019-11-27 DIAGNOSIS — M53.3 SACROILIAC JOINT PAIN: Primary | ICD-10-CM

## 2019-11-27 PROCEDURE — 99212 PR OFFICE/OUTPT VISIT, EST, LEVL II, 10-19 MIN: ICD-10-PCS | Mod: S$GLB,,, | Performed by: PHYSICIAN ASSISTANT

## 2019-11-27 PROCEDURE — 1125F AMNT PAIN NOTED PAIN PRSNT: CPT | Mod: S$GLB,,, | Performed by: PHYSICIAN ASSISTANT

## 2019-11-27 PROCEDURE — 3288F PR FALLS RISK ASSESSMENT DOCUMENTED: ICD-10-PCS | Mod: CPTII,S$GLB,, | Performed by: PHYSICIAN ASSISTANT

## 2019-11-27 PROCEDURE — 1125F PR PAIN SEVERITY QUANTIFIED, PAIN PRESENT: ICD-10-PCS | Mod: S$GLB,,, | Performed by: PHYSICIAN ASSISTANT

## 2019-11-27 PROCEDURE — 3288F FALL RISK ASSESSMENT DOCD: CPT | Mod: CPTII,S$GLB,, | Performed by: PHYSICIAN ASSISTANT

## 2019-11-27 PROCEDURE — 1100F PR PT FALLS ASSESS DOC 2+ FALLS/FALL W/INJURY/YR: ICD-10-PCS | Mod: CPTII,S$GLB,, | Performed by: PHYSICIAN ASSISTANT

## 2019-11-27 PROCEDURE — 1159F MED LIST DOCD IN RCRD: CPT | Mod: S$GLB,,, | Performed by: PHYSICIAN ASSISTANT

## 2019-11-27 PROCEDURE — 1100F PTFALLS ASSESS-DOCD GE2>/YR: CPT | Mod: CPTII,S$GLB,, | Performed by: PHYSICIAN ASSISTANT

## 2019-11-27 PROCEDURE — 99999 PR PBB SHADOW E&M-EST. PATIENT-LVL V: ICD-10-PCS | Mod: PBBFAC,,, | Performed by: PHYSICIAN ASSISTANT

## 2019-11-27 PROCEDURE — 99999 PR PBB SHADOW E&M-EST. PATIENT-LVL V: CPT | Mod: PBBFAC,,, | Performed by: PHYSICIAN ASSISTANT

## 2019-11-27 PROCEDURE — 99212 OFFICE O/P EST SF 10 MIN: CPT | Mod: S$GLB,,, | Performed by: PHYSICIAN ASSISTANT

## 2019-11-27 PROCEDURE — 1159F PR MEDICATION LIST DOCUMENTED IN MEDICAL RECORD: ICD-10-PCS | Mod: S$GLB,,, | Performed by: PHYSICIAN ASSISTANT

## 2019-11-27 NOTE — PROGRESS NOTES
This note was completed with dictation software and grammatical errors may exist.    CC:Back pain    HPI: The patient is a 74-year-old man with a history of diabetes, COPD, CAD on anticoagulation who presents in referral from [facility] for continued back pain and bilateral leg pain.  He is status post right L5 medial branch and S1, 2 and 3 lateral branch radiofrequency ablation on 10/29/2019 with 100% relief.  He currently denies having any back or buttock pain at this time. He denies weakness, numbness or incontinence.    Pain intervention history: He is status post June 2016 L2-L5 PSIF with Dr. Redmond. He initially did have some relief of pain after surgery. He did undergo PT and has had 2 DEEJAY since surgery without benefit.   He had seen Dr. Chang and had undergone some injections, unclear if any of these were sacroiliac joint injections but he denies any relief..  He was previously taking hydrocodone sparingly, over the years when he has had improvement, he has completely discontinued this.  He has been taking Lyrica 75 mg twice a day with unclear relief.  For the last several months his pain is becoming much more severe and is taking this up to 3 times daily.  He states that he does not like taking this but he has been taking it on and off since 1981.  He is status post bilateral sacral iliac joint injections on 6/18/18 with 100% relief lasting 2 weeks, now reporting mild relief. He is status post right L5 medial branch and S1, 2 and 3 lateral branch radiofrequency ablation on 08/09/2018 with almost 100% relief his right low back and right buttock pain. He is status post left L5 medial branch and S1, 2 and 3 lateral branch radiofrequency ablation on 09/13/2018 with greater than 75% relief of his left low back and left buttock pain.  He is status post right L5 medial branch and S1, 2 and 3 lateral branch radiofrequency ablation on 10/29/2019 with 100% relief.     ROS: He reports back pain,  leg pain, difficulty sleeping.  Balance of review of systems is negative.    Past Medical History:   Diagnosis Date    A-fib     Anticoagulant long-term use     eliquis    Arthritis     COPD (chronic obstructive pulmonary disease)     Coronary artery disease     Diabetes mellitus     borderline, no meds    Encounter for blood transfusion     Hypertension     Lumbar stenosis without neurogenic claudication     Thyroid disease     hypo       Past Surgical History:   Procedure Laterality Date    CARDIOVERSION      CORONARY ARTERY BYPASS GRAFT      single vessel ; 4 vessel ; plus 8 stents    CORONARY STENT PLACEMENT      x 8    HERNIA REPAIR      umbilical    INJECTION OF ANESTHETIC AGENT INTO SACROILIAC JOINT Bilateral 2018    Procedure: BLOCK-JOINT-SACROILIAC;  Surgeon: Ivan King MD;  Location: [facility] OR;  Service: Pain Management;  Laterality: Bilateral;    lumbar decompression      X 3    RADIOFREQUENCY ABLATION OF LUMBAR MEDIAL BRANCH NERVE AT SINGLE LEVEL Right 10/29/2019    Procedure: Radiofrequency Ablation, Nerve, Spinal, Lumbar, Medial Branch, L5, and lateral branches S1, S2, S3;  Surgeon: Ivan King MD;  Location: [facility] OR;  Service: Pain Management;  Laterality: Right;    RHIZOTOMY W/ RADIOFREQUENCY ABLATION         Social History     Socioeconomic History    Marital status:      Spouse name: Not on file    Number of children: Not on file    Years of education: Not on file    Highest education level: Not on file   Occupational History    Not on file   Social Needs    Financial resource strain: Not on file    Food insecurity:     Worry: Not on file     Inability: Not on file    Transportation needs:     Medical: Not on file     Non-medical: Not on file   Tobacco Use    Smoking status: Former Smoker     Packs/day: 3.00     Years: 30.00     Pack years: 90.00     Last attempt to quit:      Years since quittin.9    Smokeless tobacco:  Never Used   Substance and Sexual Activity    Alcohol use: Yes     Comment: occasional monthly    Drug use: No    Sexual activity: Not on file   Lifestyle    Physical activity:     Days per week: Not on file     Minutes per session: Not on file    Stress: Not on file   Relationships    Social connections:     Talks on phone: Not on file     Gets together: Not on file     Attends [employer] service: Not on file     Active member of club or organization: Not on file     Attends meetings of clubs or organizations: Not on file     Relationship status: Not on file   Other Topics Concern    Not on file   Social History Narrative    Not on file         Medications/Allergies: See med card    Vitals:    11/27/19 1407   BP: (!) 165/77   Pulse: 62   Resp: 18   Temp: 96.2 °F (35.7 °C)   TempSrc: Oral   SpO2: 95%   Weight: 84.8 kg (186 lb 15.2 oz)   PainSc:   2   PainLoc: Back         Physical exam:  Gen: A and O x3, pleasant, well-groomed  Skin: No rashes or obvious lesions  HEENT: PERRLA, no obvious deformities on ears or in canals. Trachea midline.  CVS: Regular rate and rhythm, normal palpable pulses.  Resp:No increased work of breathing, symmetrical chest rise.  Abdomen: Soft, NT/ND.  Musculoskeletal:  Cautious gait, ambulating with a cane.  Has difficulty moving from sitting to standing without using his arms.    Neuro:  Lower extremities: 5/5 strength bilaterally, except for left hip flexion 4/5.  Reflexes: Patellar 0+, Achilles 0+ bilaterally.  Sensory:  Intact and symmetrical to light touch and pinprick in L2-S1 dermatomes bilaterally, hypoesthesia in the lower legs bilaterally.    Lumbar spine:  Lumbar spine: Range of motion is moderately reduced with both flexion and extension without increased pain.  Roni's test is negative bilaterally.  Supine straight leg raise is negative bilaterally.     Internal and external rotation of the hip causes no increased pain on either side.  Myofascial exam: No tenderness to  palpation across lumbar paraspinous muscles.     Imagin16 Xray L-spine  Findings: Postoperative changes related to prior posterior instrumented fusion L2-L5 again demonstrated.  No definite evidence of hardware failure or loosening.  Minimal retrolisthesis of L2 on L3 is unchanged.  Vertebral body heights are within normal limits.  Mild diffuse disc height loss throughout the fused levels is unchanged with associated osteophyte production.  Disc heights at remaining levels remain preserved. Posterior elements appear grossly intact. No acute fractures or subluxations are demonstrated.  The remaining visualized osseous and soft tissue structures demonstrate no appreciable abnormality.    MRI lumbar spine 17, outside institution: I reviewed the images personally.  There is instrumented fusion with posterior hardware at L2-S1.  Canal is patent at all levels, there is moderate foraminal stenosis to severe foraminal stenosis at multiple levels particularly at L2/3 through L4/5.    EMG 17:  Every muscle tested in the lower extremities showed neurogenic chronic moderate to severe changes. The MUAPs were large and the recruitment pattern was reduced indicating a process that is old and chronic. This pattern is commonly seen in spinal canal stenosis.  There was NO ACTIVE denervation noted in any of the muscles tested.   There was also a superimposed moderate sensorimotor predominantly axonal neuropathy.    Assessment:  The patient is a 74-year-old man with a history of diabetes, COPD, CAD on anticoagulation who presents in referral from [facility] for continued back pain and bilateral leg pain.    1. Sacroiliac joint pain       Plan:  1.  The patient had complete relief with the right L5 medial branch and S1, 2 and 3 lateral branch RFA.  This can be repeated in the future if necessary.  We discussed that we can repeat the left side if his pain returns as well.  2.  He will follow up as  needed.

## 2021-09-28 ENCOUNTER — OFFICE VISIT (OUTPATIENT)
Dept: PAIN MEDICINE | Facility: CLINIC | Age: 76
End: 2021-09-28
Payer: MEDICARE

## 2021-09-28 ENCOUNTER — HOSPITAL ENCOUNTER (OUTPATIENT)
Dept: RADIOLOGY | Facility: HOSPITAL | Age: 76
Discharge: HOME | End: 2021-09-28
Attending: PHYSICIAN ASSISTANT
Payer: MEDICARE

## 2021-09-28 VITALS
TEMPERATURE: 98 F | OXYGEN SATURATION: 97 % | WEIGHT: 180.13 LBS | HEART RATE: 69 BPM | SYSTOLIC BLOOD PRESSURE: 155 MMHG | RESPIRATION RATE: 18 BRPM | BODY MASS INDEX: 23.13 KG/M2 | DIASTOLIC BLOOD PRESSURE: 68 MMHG

## 2021-09-28 DIAGNOSIS — M25.511 ACUTE PAIN OF RIGHT SHOULDER: Primary | ICD-10-CM

## 2021-09-28 DIAGNOSIS — M25.511 ACUTE PAIN OF RIGHT SHOULDER: ICD-10-CM

## 2021-09-28 PROCEDURE — 3288F PR FALLS RISK ASSESSMENT DOCUMENTED: ICD-10-PCS | Mod: CPTII,S$GLB,, | Performed by: PHYSICIAN ASSISTANT

## 2021-09-28 PROCEDURE — 99999 PR PBB SHADOW E&M-EST. PATIENT-LVL V: ICD-10-PCS | Mod: PBBFAC,,, | Performed by: PHYSICIAN ASSISTANT

## 2021-09-28 PROCEDURE — 1101F PR PT FALLS ASSESS DOC 0-1 FALLS W/OUT INJ PAST YR: ICD-10-PCS | Mod: CPTII,S$GLB,, | Performed by: PHYSICIAN ASSISTANT

## 2021-09-28 PROCEDURE — 3288F FALL RISK ASSESSMENT DOCD: CPT | Mod: CPTII,S$GLB,, | Performed by: PHYSICIAN ASSISTANT

## 2021-09-28 PROCEDURE — 1125F AMNT PAIN NOTED PAIN PRSNT: CPT | Mod: CPTII,S$GLB,, | Performed by: PHYSICIAN ASSISTANT

## 2021-09-28 PROCEDURE — 99213 PR OFFICE/OUTPT VISIT, EST, LEVL III, 20-29 MIN: ICD-10-PCS | Mod: S$GLB,,, | Performed by: PHYSICIAN ASSISTANT

## 2021-09-28 PROCEDURE — 99999 PR PBB SHADOW E&M-EST. PATIENT-LVL V: CPT | Mod: PBBFAC,,, | Performed by: PHYSICIAN ASSISTANT

## 2021-09-28 PROCEDURE — 3078F PR MOST RECENT DIASTOLIC BLOOD PRESSURE < 80 MM HG: ICD-10-PCS | Mod: CPTII,S$GLB,, | Performed by: PHYSICIAN ASSISTANT

## 2021-09-28 PROCEDURE — 1125F PR PAIN SEVERITY QUANTIFIED, PAIN PRESENT: ICD-10-PCS | Mod: CPTII,S$GLB,, | Performed by: PHYSICIAN ASSISTANT

## 2021-09-28 PROCEDURE — 1160F RVW MEDS BY RX/DR IN RCRD: CPT | Mod: CPTII,S$GLB,, | Performed by: PHYSICIAN ASSISTANT

## 2021-09-28 PROCEDURE — 73030 X-RAY EXAM OF SHOULDER: CPT | Mod: 26,RT,, | Performed by: RADIOLOGY

## 2021-09-28 PROCEDURE — 73030 X-RAY EXAM OF SHOULDER: CPT | Mod: TC,PO,RT

## 2021-09-28 PROCEDURE — 1101F PT FALLS ASSESS-DOCD LE1/YR: CPT | Mod: CPTII,S$GLB,, | Performed by: PHYSICIAN ASSISTANT

## 2021-09-28 PROCEDURE — 99213 OFFICE O/P EST LOW 20 MIN: CPT | Mod: S$GLB,,, | Performed by: PHYSICIAN ASSISTANT

## 2021-09-28 PROCEDURE — 3077F SYST BP >= 140 MM HG: CPT | Mod: CPTII,S$GLB,, | Performed by: PHYSICIAN ASSISTANT

## 2021-09-28 PROCEDURE — 3078F DIAST BP <80 MM HG: CPT | Mod: CPTII,S$GLB,, | Performed by: PHYSICIAN ASSISTANT

## 2021-09-28 PROCEDURE — 3077F PR MOST RECENT SYSTOLIC BLOOD PRESSURE >= 140 MM HG: ICD-10-PCS | Mod: CPTII,S$GLB,, | Performed by: PHYSICIAN ASSISTANT

## 2021-09-28 PROCEDURE — 73030 XR SHOULDER COMPLETE 2 OR MORE VIEWS RIGHT: ICD-10-PCS | Mod: 26,RT,, | Performed by: RADIOLOGY

## 2021-09-28 PROCEDURE — 1159F MED LIST DOCD IN RCRD: CPT | Mod: CPTII,S$GLB,, | Performed by: PHYSICIAN ASSISTANT

## 2021-09-28 PROCEDURE — 1159F PR MEDICATION LIST DOCUMENTED IN MEDICAL RECORD: ICD-10-PCS | Mod: CPTII,S$GLB,, | Performed by: PHYSICIAN ASSISTANT

## 2021-09-28 PROCEDURE — 1160F PR REVIEW ALL MEDS BY PRESCRIBER/CLIN PHARMACIST DOCUMENTED: ICD-10-PCS | Mod: CPTII,S$GLB,, | Performed by: PHYSICIAN ASSISTANT

## 2021-10-07 ENCOUNTER — OFFICE VISIT (OUTPATIENT)
Dept: ORTHOPEDICS | Facility: CLINIC | Age: 76
End: 2021-10-07
Payer: MEDICARE

## 2021-10-07 VITALS — HEIGHT: 74 IN | WEIGHT: 180 LBS | BODY MASS INDEX: 23.1 KG/M2

## 2021-10-07 DIAGNOSIS — E11.69 TYPE 2 DIABETES MELLITUS WITH OTHER SPECIFIED COMPLICATION, UNSPECIFIED WHETHER LONG TERM INSULIN USE: ICD-10-CM

## 2021-10-07 DIAGNOSIS — M25.511 ACUTE PAIN OF RIGHT SHOULDER: Primary | ICD-10-CM

## 2021-10-07 DIAGNOSIS — J44.9 CHRONIC OBSTRUCTIVE PULMONARY DISEASE, UNSPECIFIED COPD TYPE: ICD-10-CM

## 2021-10-07 PROCEDURE — 1159F MED LIST DOCD IN RCRD: CPT | Mod: CPTII,S$GLB,, | Performed by: ORTHOPAEDIC SURGERY

## 2021-10-07 PROCEDURE — 1101F PR PT FALLS ASSESS DOC 0-1 FALLS W/OUT INJ PAST YR: ICD-10-PCS | Mod: CPTII,S$GLB,, | Performed by: ORTHOPAEDIC SURGERY

## 2021-10-07 PROCEDURE — 1160F PR REVIEW ALL MEDS BY PRESCRIBER/CLIN PHARMACIST DOCUMENTED: ICD-10-PCS | Mod: CPTII,S$GLB,, | Performed by: ORTHOPAEDIC SURGERY

## 2021-10-07 PROCEDURE — 1160F RVW MEDS BY RX/DR IN RCRD: CPT | Mod: CPTII,S$GLB,, | Performed by: ORTHOPAEDIC SURGERY

## 2021-10-07 PROCEDURE — 99204 OFFICE O/P NEW MOD 45 MIN: CPT | Mod: 25,S$GLB,, | Performed by: ORTHOPAEDIC SURGERY

## 2021-10-07 PROCEDURE — 3288F FALL RISK ASSESSMENT DOCD: CPT | Mod: CPTII,S$GLB,, | Performed by: ORTHOPAEDIC SURGERY

## 2021-10-07 PROCEDURE — 1159F PR MEDICATION LIST DOCUMENTED IN MEDICAL RECORD: ICD-10-PCS | Mod: CPTII,S$GLB,, | Performed by: ORTHOPAEDIC SURGERY

## 2021-10-07 PROCEDURE — 20610 LARGE JOINT ASPIRATION/INJECTION: R SUBACROMIAL BURSA: ICD-10-PCS | Mod: RT,S$GLB,, | Performed by: ORTHOPAEDIC SURGERY

## 2021-10-07 PROCEDURE — 99204 PR OFFICE/OUTPT VISIT, NEW, LEVL IV, 45-59 MIN: ICD-10-PCS | Mod: 25,S$GLB,, | Performed by: ORTHOPAEDIC SURGERY

## 2021-10-07 PROCEDURE — 99999 PR PBB SHADOW E&M-EST. PATIENT-LVL IV: CPT | Mod: PBBFAC,,, | Performed by: ORTHOPAEDIC SURGERY

## 2021-10-07 PROCEDURE — 1125F PR PAIN SEVERITY QUANTIFIED, PAIN PRESENT: ICD-10-PCS | Mod: CPTII,S$GLB,, | Performed by: ORTHOPAEDIC SURGERY

## 2021-10-07 PROCEDURE — 20610 DRAIN/INJ JOINT/BURSA W/O US: CPT | Mod: RT,S$GLB,, | Performed by: ORTHOPAEDIC SURGERY

## 2021-10-07 PROCEDURE — 3288F PR FALLS RISK ASSESSMENT DOCUMENTED: ICD-10-PCS | Mod: CPTII,S$GLB,, | Performed by: ORTHOPAEDIC SURGERY

## 2021-10-07 PROCEDURE — 1125F AMNT PAIN NOTED PAIN PRSNT: CPT | Mod: CPTII,S$GLB,, | Performed by: ORTHOPAEDIC SURGERY

## 2021-10-07 PROCEDURE — 99999 PR PBB SHADOW E&M-EST. PATIENT-LVL IV: ICD-10-PCS | Mod: PBBFAC,,, | Performed by: ORTHOPAEDIC SURGERY

## 2021-10-07 PROCEDURE — 1101F PT FALLS ASSESS-DOCD LE1/YR: CPT | Mod: CPTII,S$GLB,, | Performed by: ORTHOPAEDIC SURGERY

## 2021-10-07 RX ORDER — TRIAMCINOLONE ACETONIDE 40 MG/ML
80 INJECTION, SUSPENSION INTRA-ARTICULAR; INTRAMUSCULAR
Status: DISCONTINUED | OUTPATIENT
Start: 2021-10-07 | End: 2021-10-07 | Stop reason: HOSPADM

## 2021-10-07 RX ADMIN — TRIAMCINOLONE ACETONIDE 80 MG: 40 INJECTION, SUSPENSION INTRA-ARTICULAR; INTRAMUSCULAR at 02:10

## 2023-12-12 NOTE — INTERVAL H&P NOTE
Counseling and Referral of Other Preventative  (Italic type indicates deductible and co-insurance are waived)    Patient Name: Isidro White  Today's Date: 12/12/2023    Health Maintenance       Date Due Completion Date    Shingles Vaccine (1 of 2) Never done ---    Hemoglobin A1c (Diabetic Prevention Screening) 09/20/2022 9/20/2019    Influenza Vaccine (1) 09/01/2023 10/22/2021    COVID-19 Vaccine (5 - 2023-24 season) 09/01/2023 1/17/2023    Colorectal Cancer Screening 09/03/2025 9/3/2020    Lipid Panel 11/05/2025 11/5/2020    Pneumococcal Vaccines (Age 0-64) (3 - PPSV23 or PCV20) 11/16/2025 11/16/2020    TETANUS VACCINE 01/24/2029 1/24/2019        Orders Placed This Encounter   Procedures    Ambulatory referral/consult to Outpatient Case Management    Ambulatory referral/consult to Pharmacy Assistance    Ambulatory referral/consult to Podiatry       The following information is provided to all patients.  This information is to help you find resources for any of the problems found today that may be affecting your health:                Living healthy guide: www.[facility]      Understanding Diabetes: www.diabetes.org      Eating healthy: www.cdc.gov/healthyweight      CDC home safety checklist: www.cdc.gov/steadi/patient.html      Agency on Aging: www.goea.[place]      Alcoholics anonymous (AA): www.aa.org      Physical Activity: www.liban.nih.gov/on5plvk      Tobacco use: www.quitwithusla.org     Counseling and Referral of Other Preventative  (Italic type indicates deductible and co-insurance are waived)    Patient Name: Isidro White  Today's Date: 12/12/2023    Health Maintenance       Date Due Completion Date    Shingles Vaccine (1 of 2) Never done ---    Hemoglobin A1c (Diabetic Prevention Screening) 09/20/2022 9/20/2019    Influenza Vaccine (1) 09/01/2023 10/22/2021    COVID-19 Vaccine (5 - 2023-24 season) 09/01/2023 1/17/2023    Colorectal Cancer Screening 09/03/2025 9/3/2020    Lipid Panel  The patient has been examined and the H&P has been reviewed:    I concur with the findings and no changes have occurred since H&P was written.    Anesthesia/Surgery risks, benefits and alternative options discussed and understood by patient/family.          Active Hospital Problems    Diagnosis  POA    Pain of right sacroiliac joint [M53.3]  Yes      Resolved Hospital Problems    Diagnosis Date Resolved POA   No resolved problems to display.      11/05/2025 11/5/2020    Pneumococcal Vaccines (Age 0-64) (3 - PPSV23 or PCV20) 11/16/2025 11/16/2020    TETANUS VACCINE 01/24/2029 1/24/2019        Orders Placed This Encounter   Procedures    Ambulatory referral/consult to Outpatient Case Management    Ambulatory referral/consult to Pharmacy Assistance    Ambulatory referral/consult to Podiatry       The following information is provided to all patients.  This information is to help you find resources for any of the problems found today that may be affecting your health:                Living healthy guide: www.[facility] includes the [facility].[place]      Understanding Diabetes: www.diabetes.org      Eating healthy: www.cdc.gov/healthyweight      CDC home safety checklist: www.cdc.gov/steadi/patient.html      Agency on Aging: www.goea.[place]      Alcoholics anonymous (AA): www.aa.org      Physical Activity: www.liban.nih.gov/bi1pxuh      Tobacco use: www.quitwithusla.org

## 2024-05-15 PROBLEM — I48.0 PAROXYSMAL ATRIAL FIBRILLATION: Status: ACTIVE | Noted: 2024-05-15

## 2024-06-14 PROBLEM — Z86.79 S/P ABLATION OF ATRIAL FIBRILLATION: Status: ACTIVE | Noted: 2024-06-14

## 2024-06-14 PROBLEM — Z98.890 S/P ABLATION OF ATRIAL FIBRILLATION: Status: ACTIVE | Noted: 2024-06-14

## 2024-11-22 PROBLEM — Z45.018: Status: ACTIVE | Noted: 2024-11-22

## 2024-12-16 PROBLEM — I48.11 LONGSTANDING PERSISTENT ATRIAL FIBRILLATION: Status: ACTIVE | Noted: 2024-05-15

## 2024-12-16 PROBLEM — Z95.0 PRESENCE OF CARDIAC RESYNCHRONIZATION THERAPY PACEMAKER (CRT-P): Status: ACTIVE | Noted: 2024-11-22

## 2024-12-20 PROBLEM — G45.9 TIA (TRANSIENT ISCHEMIC ATTACK): Status: ACTIVE | Noted: 2024-12-20

## 2024-12-20 PROBLEM — R20.0 LEFT SIDED NUMBNESS: Status: ACTIVE | Noted: 2024-12-20

## 2024-12-20 PROBLEM — R19.7 DIARRHEA: Status: ACTIVE | Noted: 2024-12-20

## 2025-01-27 PROBLEM — I48.21 PERMANENT ATRIAL FIBRILLATION: Status: ACTIVE | Noted: 2024-05-15

## 2025-01-29 ENCOUNTER — LAB VISIT (OUTPATIENT)
Dept: LAB | Facility: HOSPITAL | Age: 80
End: 2025-01-29
Attending: NURSE PRACTITIONER
Payer: MEDICARE

## 2025-01-29 DIAGNOSIS — K92.1 MELENA: Primary | ICD-10-CM

## 2025-01-29 DIAGNOSIS — R53.83 FATIGUE, UNSPECIFIED TYPE: ICD-10-CM

## 2025-01-29 DIAGNOSIS — I50.9 HEART FAILURE, UNSPECIFIED: ICD-10-CM

## 2025-01-29 PROBLEM — M62.81 LEFT-SIDED MUSCLE WEAKNESS: Status: ACTIVE | Noted: 2025-01-29

## 2025-01-29 PROBLEM — R53.81 PHYSICAL DECONDITIONING: Status: ACTIVE | Noted: 2025-01-29

## 2025-01-29 LAB
ALBUMIN SERPL BCP-MCNC: 3.7 G/DL (ref 3.5–5.2)
ALP SERPL-CCNC: 47 U/L (ref 40–150)
ALT SERPL W/O P-5'-P-CCNC: <5 U/L (ref 10–44)
ANION GAP SERPL CALC-SCNC: 8 MMOL/L (ref 8–16)
AST SERPL-CCNC: 23 U/L (ref 10–40)
BILIRUB SERPL-MCNC: 0.9 MG/DL (ref 0.1–1)
BUN SERPL-MCNC: 33 MG/DL (ref 8–23)
CALCIUM SERPL-MCNC: 9.5 MG/DL (ref 8.7–10.5)
CHLORIDE SERPL-SCNC: 108 MMOL/L (ref 95–110)
CO2 SERPL-SCNC: 22 MMOL/L (ref 23–29)
CREAT SERPL-MCNC: 1.6 MG/DL (ref 0.5–1.4)
ERYTHROCYTE [DISTWIDTH] IN BLOOD BY AUTOMATED COUNT: 15.8 % (ref 11.5–14.5)
EST. GFR  (NO RACE VARIABLE): 43 ML/MIN/1.73 M^2
GLUCOSE SERPL-MCNC: 160 MG/DL (ref 70–110)
HCT VFR BLD AUTO: 36 % (ref 40–54)
HGB BLD-MCNC: 11.2 G/DL (ref 14–18)
MAGNESIUM SERPL-MCNC: 1.9 MG/DL (ref 1.6–2.6)
MCH RBC QN AUTO: 28.7 PG (ref 27–31)
MCHC RBC AUTO-ENTMCNC: 31.1 G/DL (ref 32–36)
MCV RBC AUTO: 92 FL (ref 82–98)
PLATELET # BLD AUTO: 224 K/UL (ref 150–450)
PMV BLD AUTO: 10.7 FL (ref 9.2–12.9)
POTASSIUM SERPL-SCNC: 4 MMOL/L (ref 3.5–5.1)
PROT SERPL-MCNC: 7.9 G/DL (ref 6–8.4)
RBC # BLD AUTO: 3.9 M/UL (ref 4.6–6.2)
SODIUM SERPL-SCNC: 138 MMOL/L (ref 136–145)
WBC # BLD AUTO: 9.58 K/UL (ref 3.9–12.7)

## 2025-01-29 PROCEDURE — 83735 ASSAY OF MAGNESIUM: CPT | Performed by: NURSE PRACTITIONER

## 2025-01-29 PROCEDURE — 85027 COMPLETE CBC AUTOMATED: CPT | Performed by: NURSE PRACTITIONER

## 2025-01-29 PROCEDURE — 80053 COMPREHEN METABOLIC PANEL: CPT | Performed by: NURSE PRACTITIONER

## 2025-01-30 ENCOUNTER — LAB VISIT (OUTPATIENT)
Dept: LAB | Facility: HOSPITAL | Age: 80
End: 2025-01-30
Attending: INTERNAL MEDICINE
Payer: MEDICARE

## 2025-01-30 DIAGNOSIS — K92.1 MELENA: ICD-10-CM

## 2025-01-30 DIAGNOSIS — R53.83 FATIGUE, UNSPECIFIED TYPE: ICD-10-CM

## 2025-01-30 LAB — OB PNL STL: POSITIVE

## 2025-01-30 PROCEDURE — 82272 OCCULT BLD FECES 1-3 TESTS: CPT | Performed by: NURSE PRACTITIONER

## 2025-02-24 PROBLEM — I51.89 GRADE II DIASTOLIC DYSFUNCTION: Status: ACTIVE | Noted: 2025-02-24

## (undated) DEVICE — TRAY NERVE BLOCK

## (undated) DEVICE — KIT COOLED RF 75MM DBL PROBE

## (undated) DEVICE — PAD ELECTROSURGICAL PAT PLATE

## (undated) DEVICE — MARKER SKIN STND TIP BLUE BARR

## (undated) DEVICE — CANNULA CVD 100MM X 20G

## (undated) DEVICE — GLOVE SURGICAL LATEX SZ 7

## (undated) DEVICE — SEE MEDLINE ITEM 152622

## (undated) DEVICE — APPLICATOR CHLORAPREP CLR 10.5

## (undated) DEVICE — NDL SPINAL SPINOCAN 22GX3.5

## (undated) DEVICE — NDL 18GA X1 1/2 REG BEVEL

## (undated) DEVICE — KIT PROBE COOLIEF RF 17G 75MM